# Patient Record
Sex: FEMALE | Race: WHITE | NOT HISPANIC OR LATINO | URBAN - METROPOLITAN AREA
[De-identification: names, ages, dates, MRNs, and addresses within clinical notes are randomized per-mention and may not be internally consistent; named-entity substitution may affect disease eponyms.]

---

## 2024-07-09 ENCOUNTER — APPOINTMENT (OUTPATIENT)
Dept: PRIMARY CARE | Facility: CLINIC | Age: 59
End: 2024-07-09
Payer: COMMERCIAL

## 2024-07-09 DIAGNOSIS — Z00.00 ROUTINE GENERAL MEDICAL EXAMINATION AT A HEALTH CARE FACILITY: Primary | ICD-10-CM

## 2024-07-09 PROCEDURE — UHSMG PR UH SELECT MEET AND GREET: Performed by: INTERNAL MEDICINE

## 2024-08-22 DIAGNOSIS — E78.5 HYPERLIPIDEMIA, UNSPECIFIED HYPERLIPIDEMIA TYPE: ICD-10-CM

## 2024-08-22 DIAGNOSIS — Z00.00 ANNUAL PHYSICAL EXAM: ICD-10-CM

## 2024-08-23 ENCOUNTER — LAB (OUTPATIENT)
Dept: LAB | Facility: LAB | Age: 59
End: 2024-08-23
Payer: COMMERCIAL

## 2024-08-23 DIAGNOSIS — Z00.00 ANNUAL PHYSICAL EXAM: ICD-10-CM

## 2024-08-23 LAB
25(OH)D3 SERPL-MCNC: 70 NG/ML (ref 30–100)
ALBUMIN SERPL BCP-MCNC: 4.6 G/DL (ref 3.4–5)
ALP SERPL-CCNC: 63 U/L (ref 33–110)
ALT SERPL W P-5'-P-CCNC: 16 U/L (ref 7–45)
ANION GAP SERPL CALC-SCNC: 11 MMOL/L (ref 10–20)
APPEARANCE UR: CLEAR
AST SERPL W P-5'-P-CCNC: 19 U/L (ref 9–39)
BACTERIA #/AREA URNS AUTO: ABNORMAL /HPF
BASOPHILS # BLD AUTO: 0.04 X10*3/UL (ref 0–0.1)
BASOPHILS NFR BLD AUTO: 1 %
BILIRUB SERPL-MCNC: 1.2 MG/DL (ref 0–1.2)
BILIRUB UR STRIP.AUTO-MCNC: NEGATIVE MG/DL
BUN SERPL-MCNC: 19 MG/DL (ref 6–23)
CALCIUM SERPL-MCNC: 9.5 MG/DL (ref 8.6–10.3)
CHLORIDE SERPL-SCNC: 100 MMOL/L (ref 98–107)
CHOLEST SERPL-MCNC: 236 MG/DL (ref 0–199)
CHOLESTEROL/HDL RATIO: 2.4
CO2 SERPL-SCNC: 30 MMOL/L (ref 21–32)
COLOR UR: ABNORMAL
CREAT SERPL-MCNC: 0.79 MG/DL (ref 0.5–1.05)
EGFRCR SERPLBLD CKD-EPI 2021: 88 ML/MIN/1.73M*2
EOSINOPHIL # BLD AUTO: 0.09 X10*3/UL (ref 0–0.7)
EOSINOPHIL NFR BLD AUTO: 2.3 %
ERYTHROCYTE [DISTWIDTH] IN BLOOD BY AUTOMATED COUNT: 12 % (ref 11.5–14.5)
EST. AVERAGE GLUCOSE BLD GHB EST-MCNC: 97 MG/DL
GLUCOSE SERPL-MCNC: 87 MG/DL (ref 74–99)
GLUCOSE UR STRIP.AUTO-MCNC: NORMAL MG/DL
HBA1C MFR BLD: 5 %
HCT VFR BLD AUTO: 44.1 % (ref 36–46)
HDLC SERPL-MCNC: 97.6 MG/DL
HGB BLD-MCNC: 14.6 G/DL (ref 12–16)
IMM GRANULOCYTES # BLD AUTO: 0.01 X10*3/UL (ref 0–0.7)
IMM GRANULOCYTES NFR BLD AUTO: 0.3 % (ref 0–0.9)
KETONES UR STRIP.AUTO-MCNC: NEGATIVE MG/DL
LDLC SERPL CALC-MCNC: 126 MG/DL
LEUKOCYTE ESTERASE UR QL STRIP.AUTO: ABNORMAL
LYMPHOCYTES # BLD AUTO: 1.42 X10*3/UL (ref 1.2–4.8)
LYMPHOCYTES NFR BLD AUTO: 36.3 %
MCH RBC QN AUTO: 31.5 PG (ref 26–34)
MCHC RBC AUTO-ENTMCNC: 33.1 G/DL (ref 32–36)
MCV RBC AUTO: 95 FL (ref 80–100)
MONOCYTES # BLD AUTO: 0.35 X10*3/UL (ref 0.1–1)
MONOCYTES NFR BLD AUTO: 9 %
NEUTROPHILS # BLD AUTO: 2 X10*3/UL (ref 1.2–7.7)
NEUTROPHILS NFR BLD AUTO: 51.1 %
NITRITE UR QL STRIP.AUTO: NEGATIVE
NON HDL CHOLESTEROL: 138 MG/DL (ref 0–149)
NRBC BLD-RTO: 0 /100 WBCS (ref 0–0)
PH UR STRIP.AUTO: 7 [PH]
PLATELET # BLD AUTO: 217 X10*3/UL (ref 150–450)
POTASSIUM SERPL-SCNC: 4 MMOL/L (ref 3.5–5.3)
PROT SERPL-MCNC: 6.6 G/DL (ref 6.4–8.2)
PROT UR STRIP.AUTO-MCNC: NEGATIVE MG/DL
RBC # BLD AUTO: 4.64 X10*6/UL (ref 4–5.2)
RBC # UR STRIP.AUTO: NEGATIVE /UL
RBC #/AREA URNS AUTO: ABNORMAL /HPF
SODIUM SERPL-SCNC: 137 MMOL/L (ref 136–145)
SP GR UR STRIP.AUTO: 1.01
SQUAMOUS #/AREA URNS AUTO: ABNORMAL /HPF
TRIGL SERPL-MCNC: 62 MG/DL (ref 0–149)
TSH SERPL-ACNC: 1.52 MIU/L (ref 0.44–3.98)
UROBILINOGEN UR STRIP.AUTO-MCNC: NORMAL MG/DL
VLDL: 12 MG/DL (ref 0–40)
WBC # BLD AUTO: 3.9 X10*3/UL (ref 4.4–11.3)
WBC #/AREA URNS AUTO: ABNORMAL /HPF

## 2024-08-23 PROCEDURE — 80061 LIPID PANEL: CPT

## 2024-08-23 PROCEDURE — 81001 URINALYSIS AUTO W/SCOPE: CPT

## 2024-08-23 PROCEDURE — 85025 COMPLETE CBC W/AUTO DIFF WBC: CPT

## 2024-08-23 PROCEDURE — 84443 ASSAY THYROID STIM HORMONE: CPT

## 2024-08-23 PROCEDURE — 36415 COLL VENOUS BLD VENIPUNCTURE: CPT

## 2024-08-23 PROCEDURE — 80053 COMPREHEN METABOLIC PANEL: CPT

## 2024-08-27 ENCOUNTER — APPOINTMENT (OUTPATIENT)
Dept: PRIMARY CARE | Facility: CLINIC | Age: 59
End: 2024-08-27
Payer: COMMERCIAL

## 2024-08-27 VITALS
WEIGHT: 127.8 LBS | HEIGHT: 69 IN | OXYGEN SATURATION: 98 % | BODY MASS INDEX: 18.93 KG/M2 | HEART RATE: 62 BPM | DIASTOLIC BLOOD PRESSURE: 76 MMHG | SYSTOLIC BLOOD PRESSURE: 118 MMHG

## 2024-08-27 DIAGNOSIS — M85.80 OSTEOPENIA AFTER MENOPAUSE: Primary | ICD-10-CM

## 2024-08-27 DIAGNOSIS — Z85.820 HX OF MELANOMA OF SKIN: ICD-10-CM

## 2024-08-27 DIAGNOSIS — Z78.0 OSTEOPENIA AFTER MENOPAUSE: Primary | ICD-10-CM

## 2024-08-27 ASSESSMENT — ENCOUNTER SYMPTOMS
SINUS PAIN: 0
CONSTITUTIONAL NEGATIVE: 1
VOMITING: 0
HEMATURIA: 0
LIGHT-HEADEDNESS: 0
FACIAL SWELLING: 0
BACK PAIN: 0
WHEEZING: 0
ABDOMINAL PAIN: 0
CONFUSION: 0
BRUISES/BLEEDS EASILY: 0
FEVER: 0
JOINT SWELLING: 0
DIARRHEA: 0
SLEEP DISTURBANCE: 0
ABDOMINAL DISTENTION: 0
NAUSEA: 0
SHORTNESS OF BREATH: 0
NUMBNESS: 0
ADENOPATHY: 0
ACTIVITY CHANGE: 0
CONSTIPATION: 0
BLOOD IN STOOL: 0
DYSURIA: 0
RHINORRHEA: 0
WEAKNESS: 0
NECK STIFFNESS: 0
SORE THROAT: 0
FATIGUE: 0
PALPITATIONS: 0
MYALGIAS: 0
NERVOUS/ANXIOUS: 0
DYSPHORIC MOOD: 0
TROUBLE SWALLOWING: 0
UNEXPECTED WEIGHT CHANGE: 0
CHILLS: 0
CHEST TIGHTNESS: 0
HYPERACTIVE: 0
ARTHRALGIAS: 0
POLYDIPSIA: 0
COUGH: 0
DIZZINESS: 0
HEADACHES: 0
FREQUENCY: 0
SINUS PRESSURE: 0

## 2024-08-27 ASSESSMENT — PROMIS GLOBAL HEALTH SCALE
CARRYOUT_SOCIAL_ACTIVITIES: EXCELLENT
RATE_AVERAGE_PAIN: 0
EMOTIONAL_PROBLEMS: RARELY
RATE_MENTAL_HEALTH: VERY GOOD
CARRYOUT_PHYSICAL_ACTIVITIES: COMPLETELY
RATE_QUALITY_OF_LIFE: EXCELLENT
RATE_SOCIAL_SATISFACTION: EXCELLENT
RATE_GENERAL_HEALTH: EXCELLENT
RATE_PHYSICAL_HEALTH: VERY GOOD

## 2024-08-27 NOTE — PROGRESS NOTES
Cece Aguilar       Patient is here for a complete physical and a general checkup.  She generally is quite healthy.  She has a couple of ongoing issues.  1.  She is battling some right foot tendinitis.  She has had this off and on for as many as 15 years.  Recently however it is flared up after she lunged for a ball playing tennis.  She felt a pulling sensation in the bottom of her right foot instep.  She has been working with a foot and ankle specialist for the last several months doing a lot of stretching and exercising and the symptoms are slowly improving.  She is hoping to get herself into a condition where she can go on a hiking trip this fall in [place].  2.  She had several stereotactic biopsies of her left breast although nothing has ever been found of concern.  Otherwise she is very healthy individual she exercises vigorously on a regular basis doing a host of aerobic and strength training exercises.  She also plays a lot of tennis golf and pickleball.  3.  She has several recent bites on her back and is concerned about those.  She think she may have been bitten by a spider.  4.  Reviewed her labs her lipids are at target she had a cardiac calcium score of 0 a couple of years ago.  5.  She is up-to-date on mammogram  6.  She is up-to-date on colonoscopy  7.  She has never had a bone density study.           Review of Systems   Constitutional: Negative.  Negative for activity change, chills, fatigue, fever and unexpected weight change.   HENT:  Positive for hearing loss. Negative for dental problem, ear pain, facial swelling, mouth sores, rhinorrhea, sinus pressure, sinus pain, sore throat, tinnitus and trouble swallowing.    Eyes:  Positive for visual disturbance (myopia).   Respiratory:  Negative for cough, chest tightness, shortness of breath and wheezing.    Cardiovascular:  Negative for chest pain, palpitations and leg swelling.   Gastrointestinal:  Negative for abdominal distention,  "abdominal pain, blood in stool, constipation, diarrhea, nausea and vomiting.   Endocrine: Negative for cold intolerance, heat intolerance, polydipsia and polyuria.   Genitourinary:  Negative for dysuria, frequency, hematuria and urgency.   Musculoskeletal:  Negative for arthralgias, back pain, joint swelling, myalgias and neck stiffness.   Skin:  Negative for rash.        Several L breast biopsies    Allergic/Immunologic: Negative for food allergies.   Neurological:  Negative for dizziness, weakness, light-headedness, numbness and headaches.   Hematological:  Negative for adenopathy. Does not bruise/bleed easily.   Psychiatric/Behavioral:  Negative for confusion, dysphoric mood and sleep disturbance. The patient is not nervous/anxious and is not hyperactive.           Objective      Visit Vitals  /76   Pulse 62   Ht 1.74 m (5' 8.5\")   Wt 58 kg (127 lb 12.8 oz)   SpO2 98%   BMI 19.15 kg/m²   Smoking Status Never   BSA 1.67 m²        Physical Exam  Constitutional:       Appearance: Normal appearance. She is normal weight.   HENT:      Head: Normocephalic and atraumatic.      Right Ear: Tympanic membrane, ear canal and external ear normal.      Left Ear: Tympanic membrane, ear canal and external ear normal.      Nose: Nose normal.      Mouth/Throat:      Mouth: Mucous membranes are moist. No oral lesions.      Pharynx: Oropharynx is clear. Uvula midline. No oropharyngeal exudate or posterior oropharyngeal erythema.   Neck:      Thyroid: No thyroid mass or thyromegaly.      Vascular: No carotid bruit or JVD.   Cardiovascular:      Rate and Rhythm: Normal rate and regular rhythm.      Pulses: Normal pulses.           Carotid pulses are 2+ on the right side and 2+ on the left side.       Radial pulses are 2+ on the right side and 2+ on the left side.        Femoral pulses are 2+ on the right side and 2+ on the left side.       Popliteal pulses are 2+ on the right side and 2+ on the left side.        Dorsalis pedis " pulses are 2+ on the right side and 2+ on the left side.        Posterior tibial pulses are 2+ on the right side and 2+ on the left side.      Heart sounds: Normal heart sounds, S1 normal and S2 normal. No murmur heard.  Pulmonary:      Effort: Pulmonary effort is normal.      Breath sounds: Normal breath sounds.   Chest:   Breasts:     Right: Normal. No mass or nipple discharge.      Left: Normal. No mass or nipple discharge.   Abdominal:      General: Abdomen is flat. Bowel sounds are normal.      Palpations: Abdomen is soft.      Tenderness: There is no abdominal tenderness.      Hernia: No hernia is present.   Musculoskeletal:         General: No swelling or tenderness. Normal range of motion.      Cervical back: Normal range of motion and neck supple. No rigidity.      Right lower leg: No edema.      Left lower leg: No edema.   Lymphadenopathy:      Cervical: No cervical adenopathy.   Skin:     General: Skin is warm and dry.      Findings: No lesion (Several insect bites on her back.  With a small erythematous reaction.) or rash.   Neurological:      General: No focal deficit present.      Mental Status: She is alert and oriented to person, place, and time. Mental status is at baseline.   Psychiatric:         Mood and Affect: Mood normal.         Thought Content: Thought content normal.              Assess/Plan SmartLinks:   Problem List Items Addressed This Visit             ICD-10-CM    Osteopenia after menopause - Primary M85.80, Z78.0     DEXA scan is ordered for initial bone density assessment.  Will treat her accordingly.         Relevant Orders    XR DEXA bone density    Hx of melanoma of skin Z85.820     Patient continues to see her dermatologist on a regular basis.        Patient's lipids are at target  Patient is up-to-date on her mammogram  Patient is up-to-date on colonoscopy  Patient is up-to-date on all vaccines  Patient will meet with our fitness assessment team

## 2024-09-17 ENCOUNTER — HOSPITAL ENCOUNTER (OUTPATIENT)
Dept: RADIOLOGY | Facility: HOSPITAL | Age: 59
Discharge: HOME | End: 2024-09-17
Payer: COMMERCIAL

## 2024-09-17 DIAGNOSIS — Z78.0 OSTEOPENIA AFTER MENOPAUSE: ICD-10-CM

## 2024-09-17 DIAGNOSIS — M85.80 OSTEOPENIA AFTER MENOPAUSE: ICD-10-CM

## 2024-09-17 PROCEDURE — 77080 DXA BONE DENSITY AXIAL: CPT

## 2024-09-17 PROCEDURE — 77080 DXA BONE DENSITY AXIAL: CPT | Performed by: RADIOLOGY

## 2024-09-27 ENCOUNTER — TELEPHONE (OUTPATIENT)
Dept: OBSTETRICS AND GYNECOLOGY | Facility: CLINIC | Age: 59
End: 2024-09-27
Payer: COMMERCIAL

## 2024-10-03 ENCOUNTER — APPOINTMENT (OUTPATIENT)
Dept: OBSTETRICS AND GYNECOLOGY | Facility: CLINIC | Age: 59
End: 2024-10-03
Payer: COMMERCIAL

## 2024-10-16 DIAGNOSIS — Z12.31 ENCOUNTER FOR SCREENING MAMMOGRAM FOR BREAST CANCER: ICD-10-CM

## 2024-11-05 ENCOUNTER — HOSPITAL ENCOUNTER (OUTPATIENT)
Dept: RADIOLOGY | Facility: CLINIC | Age: 59
Discharge: HOME | End: 2024-11-05
Payer: COMMERCIAL

## 2024-11-05 DIAGNOSIS — Z12.31 ENCOUNTER FOR SCREENING MAMMOGRAM FOR BREAST CANCER: ICD-10-CM

## 2024-11-05 PROCEDURE — 77067 SCR MAMMO BI INCL CAD: CPT

## 2024-11-12 ENCOUNTER — HOSPITAL ENCOUNTER (OUTPATIENT)
Dept: RADIOLOGY | Facility: EXTERNAL LOCATION | Age: 59
Discharge: HOME | End: 2024-11-12

## 2024-11-26 ENCOUNTER — APPOINTMENT (OUTPATIENT)
Dept: PRIMARY CARE | Facility: CLINIC | Age: 59
End: 2024-11-26

## 2025-01-10 ENCOUNTER — OFFICE VISIT (OUTPATIENT)
Dept: PRIMARY CARE | Facility: CLINIC | Age: 60
End: 2025-01-10
Payer: COMMERCIAL

## 2025-01-10 VITALS — OXYGEN SATURATION: 98 % | SYSTOLIC BLOOD PRESSURE: 110 MMHG | HEART RATE: 72 BPM | DIASTOLIC BLOOD PRESSURE: 72 MMHG

## 2025-01-10 DIAGNOSIS — M19.071 ARTHRITIS OF RIGHT FOOT: Primary | ICD-10-CM

## 2025-01-10 PROCEDURE — 99213 OFFICE O/P EST LOW 20 MIN: CPT | Performed by: INTERNAL MEDICINE

## 2025-01-10 RX ORDER — MELOXICAM 15 MG/1
15 TABLET ORAL DAILY
Qty: 30 TABLET | Refills: 11 | Status: SHIPPED | OUTPATIENT
Start: 2025-01-10 | End: 2026-01-10

## 2025-01-10 RX ORDER — OMEPRAZOLE 20 MG/1
20 CAPSULE, DELAYED RELEASE ORAL DAILY
Qty: 30 CAPSULE | Refills: 5 | Status: SHIPPED | OUTPATIENT
Start: 2025-01-10 | End: 2025-07-09

## 2025-01-10 NOTE — PROGRESS NOTES
R foot since July 17ys fx bone   Patient is here for pain in her right foot that has been present now since July.  Patient fractured her right foot about 17 years ago after doing some long runs.  She was treated nonoperatively at that time.  She has always had history of pes planus and narrow feet.  She has noticed increasing pain in the right foot mostly in the medial portion above the arch that is worse when she does any kind of strenuous physical activity.  She has been working with a chiropractor and a running foot specialist who have prescribed some new shoes and an orthotic.  She has not taken any anti-inflammatories.    She has been doing a lot of exercising and strengthening and stretching exercises.  She is been doing some taping as well.    Her exam reveals significant pes planus bilaterally right greater than left.  She has some mild tenderness on the medial malleolus area.  There is no redness warmth or swelling.    Persistent foot pain in this patient with a history of previous injury significant pes planus and narrow feet.  I suspect she has some chronic tendinitis or arthritis.  Will start her on meloxicam 15 mg daily and refer her over to Dr. Jay for further evaluation she will benefit from imaging.  She is also given a prescription for omeprazole 20 mg daily she tends to get heartburn when she takes nonsteroidals.

## 2025-01-14 ENCOUNTER — APPOINTMENT (OUTPATIENT)
Dept: PRIMARY CARE | Facility: CLINIC | Age: 60
End: 2025-01-14
Payer: COMMERCIAL

## 2025-02-03 ENCOUNTER — OFFICE VISIT (OUTPATIENT)
Dept: ORTHOPEDIC SURGERY | Facility: CLINIC | Age: 60
End: 2025-02-03
Payer: COMMERCIAL

## 2025-02-03 ENCOUNTER — HOSPITAL ENCOUNTER (OUTPATIENT)
Dept: RADIOLOGY | Facility: CLINIC | Age: 60
Discharge: HOME | End: 2025-02-03
Payer: COMMERCIAL

## 2025-02-03 DIAGNOSIS — M76.821 POSTERIOR TIBIAL TENDON DYSFUNCTION (PTTD) OF RIGHT LOWER EXTREMITY: ICD-10-CM

## 2025-02-03 DIAGNOSIS — M21.42 BILATERAL PES PLANUS: Primary | ICD-10-CM

## 2025-02-03 DIAGNOSIS — M21.6X1 ACQUIRED PRONATION DEFORMITY OF RIGHT FOOT: ICD-10-CM

## 2025-02-03 DIAGNOSIS — M25.571 RIGHT ANKLE PAIN, UNSPECIFIED CHRONICITY: ICD-10-CM

## 2025-02-03 DIAGNOSIS — M21.41 BILATERAL PES PLANUS: Primary | ICD-10-CM

## 2025-02-03 DIAGNOSIS — M19.071 ARTHRITIS OF RIGHT FOOT: ICD-10-CM

## 2025-02-03 DIAGNOSIS — M21.6X2 ACQUIRED PRONATION DEFORMITY OF LEFT FOOT: ICD-10-CM

## 2025-02-03 PROCEDURE — 73630 X-RAY EXAM OF FOOT: CPT | Mod: RIGHT SIDE | Performed by: RADIOLOGY

## 2025-02-03 PROCEDURE — 73630 X-RAY EXAM OF FOOT: CPT | Mod: RT

## 2025-02-03 PROCEDURE — 99204 OFFICE O/P NEW MOD 45 MIN: CPT | Performed by: STUDENT IN AN ORGANIZED HEALTH CARE EDUCATION/TRAINING PROGRAM

## 2025-02-03 PROCEDURE — 73610 X-RAY EXAM OF ANKLE: CPT | Mod: RT

## 2025-02-03 PROCEDURE — 73610 X-RAY EXAM OF ANKLE: CPT | Mod: RIGHT SIDE | Performed by: RADIOLOGY

## 2025-02-03 PROCEDURE — 99214 OFFICE O/P EST MOD 30 MIN: CPT | Performed by: STUDENT IN AN ORGANIZED HEALTH CARE EDUCATION/TRAINING PROGRAM

## 2025-02-03 ASSESSMENT — PAIN - FUNCTIONAL ASSESSMENT: PAIN_FUNCTIONAL_ASSESSMENT: 0-10

## 2025-02-03 ASSESSMENT — PAIN DESCRIPTION - DESCRIPTORS: DESCRIPTORS: ACHING;DULL;TENDER;THROBBING

## 2025-02-03 ASSESSMENT — PAIN SCALES - GENERAL: PAINLEVEL_OUTOF10: 3

## 2025-02-03 NOTE — PATIENT INSTRUCTIONS
[facility]: [phone]    [facility]: [phone]    [facility]: [phone]    [facility]:  [phone]

## 2025-02-03 NOTE — PROGRESS NOTES
ORTHOPAEDIC SURGERY OUTPATIENT PROGRESS NOTE    Chief Complaint:  Right foot and ankle pain    History Of Present Illness  Cece Aguilar is a 59 y.o. female who presents for evaluation of right foot and ankle pain as a referral from Dr. Guillory.  Patient has been quite active, previously running half marathons.  She continues to participate in racquet sports including tennis as well as paddle and CrossFit.  Unfortunately, last spring she had to stop running due to pain.  She attempted to run in July 2024 but was unable to due to pain.  She has been following with a chiropractic sports medicine provider, Rey Bloom who assisted her in controlling her swelling and pain.  She has been trying to build back more for activities but continues to notice worsening pain with running as well as lunges.  She continues with 3 out of 10 pain, this is worse with higher impact activity.  She obtained new shoes in December and has previously been prescribed custom fabricated orthotics approximately 5 years ago and has been taking meloxicam of late with some improvement in her symptoms overall.  Patient has not had any formal HEP, PT or CSI.  She has been using orthotics, no braces or inserts in her shoes.     Past Medical History  Past Medical History:   Diagnosis Date    Melanoma (Multi)        Surgical History  Recent Surgeries in Orthopaedic Surgery            No cases to display             Social History  Social History     Socioeconomic History    Marital status:    Tobacco Use    Smoking status: Never   Substance and Sexual Activity    Alcohol use: Yes     Alcohol/week: 14.0 standard drinks of alcohol     Types: 14 Glasses of wine per week    Drug use: Never     Social Drivers of Health     Financial Resource Strain: Low Risk  (4/26/2022)    [facility] ([facility])     Difficulty of Paying Living Expenses: Not hard at all   Food Insecurity: No Food  Insecurity (4/26/2022)    Received from [facility]    Hunger Vital Sign     Worried About Running Out of Food in the Last Year: Never true     Ran Out of Food in the Last Year: Never true   Transportation Needs: No Transportation Needs (4/26/2022)    Received from [facility]    PRAPARE - Transportation     Lack of Transportation (Medical): No     Lack of Transportation (Non-Medical): No   Physical Activity: Sufficiently Active (4/26/2022)    Received from [facility]    Exercise Vital Sign     Days of Exercise per Week: 6 days     Minutes of Exercise per Session: 30 min   Stress: No Stress Concern Present (4/26/2022)    Received from [facility]    [employer] - Occupational Stress Questionnaire     Feeling of Stress : Not at all   Social Connections: Unknown (4/26/2022)    Received from [facility]    Social Connection and Isolation Panel [NHANES]     Frequency of Communication with Friends and Family: More than three times a week     Frequency of Social Gatherings with Friends and Family: More than three times a week     Attends [employer] Services: Patient declined     Active Member of Clubs or Organizations: Yes     Attends Club or Organization Meetings: More than 4 times per year     Marital Status:    Housing Stability: Low Risk  (4/26/2022)    Received from [facility]    Housing Stability Vital Sign     Unable to Pay for Housing in the Last Year: No     Number of Places Lived in the Last Year: 1     Unstable Housing in the Last Year: No       Family History  Family History   Problem Relation Name Age of Onset    Melanoma Mother      Hyperlipidemia Mother      Melanoma Father      Hyperlipidemia Father          Allergies  Allergies   Allergen Reactions    Insect Venom Swelling       Review of Systems  REVIEW OF SYSTEMS  Constitutional:  no unplanned weight loss  Psychiatric: no suicidal ideation  ENT: no vision changes, no sinus problems  Pulmonary: no shortness of breath  Lymphatic: no enlarged lymph nodes  Cardiovascular: no chest pain or shortness of breath  Gastrointestinal: no stomach problems  Genitourinary: no dysuria   Skin: no rashes  Endocrine: no thyroid problems  Neurological: no headache, no numbness  Hematological: no easy bruising  Musculoskeletal: Right foot and ankle pain     Physical Exam  PHYSICAL EXAMINATION  Constitutional Exam: well developed and well nourished  Psychiatric Exam: alert and oriented, appropriate mood and behavior  Eye Exam: EOMI  Pulmonary Exam: breathing non-labored, no apparent distress  Lymphatic exam: no appreciable lymphadenopathy in the lower extremities  Cardiovascular exam: RRR to peripheral palpation, DP pulses 2+, PT 2+, toes are pink with good capillary refill, no pitting edema  Skin exam: no open lesions, rashes, abrasions or ulcerations  Neurological exam: sensation to light touch intact in both lower extremities in peripheral and dermatomal distributions (except for any abnormalities noted in musculoskeletal exam)    Musculoskeletal exam: Right lower extremity examination.  Patient pain localized to the anterior medial aspect of the ankle and just distal to the medial malleolus.  She is focally tender to palpation overlying the deltoid as well as PTT.  Minimal tenderness to palpation at the sinus Tarsi although does note pain in that region at times.  No obvious ankle effusion.  Patient has pain-free and supple ankle range of motion without crepitus.  subtalar and midtarsal joint range of motion supple and pain-free.  Patient has greater than physiologic hindfoot valgus with pes planus arch posture and too many toes sign evident.  Patient has sensation grossly intact to light touch in a saphenous, sural, superficial peroneal, deep peroneal and tibial nerve distribution.  She is intact  plantarflexion, dorsiflexion and EHL, she has 4- out of 5 strength in inversion with pain and 5 out of 5 strength in eversion without pain.  She has 2+ DP/PT pulses palpated.    Left lower extremity examination.  Patient with greater than physiologic hindfoot valgus with pes planus arch posture and forefoot abduction noted.  Foot appears warm well-perfused.  Patient is unable to perform a single or double leg assisted heel rise.  The calcaneus does not correct beyond neutral.    Last Recorded Vitals  There were no vitals taken for this visit.    Laboratory Results  No results found for this or any previous visit (from the past 24 hours).     Radiology Results  X-ray imaging 3 view weightbearing right foot and ankle reviewed and independently evaluated by me on 02/03/2025 demonstrates no acute fracture or dislocation.  Ankle mortise appears well aligned, there is no obvious talar tilt or medial clear space widening.  AP foot projection suggests increased kites angle and possible os navicularis, lateral foot suggest pes planus posture with perhaps mild TN OA with dorsal osteophytosis.    Assessment/Plan:  59-year-old female who my impression has right foot pain likely secondary to progressive collapsing foot deformity with features of pes planus posture, acquired pronation deformity, PTTD as well as left pes planus posture and acquired pronation deformity.  I have reviewed the diagnosis and treatment options extensively with the patient.  I would recommend the patient continue weightbearing to her tolerance in her bilateral lower extremities.  We discussed the role for oral and topical anti-inflammatories for symptomatic relief.  I will provide her with a new prescription for a custom fabricated orthotic to update her orthotics.  Additionally, I will refer her to physical therapy for PTT strengthening.  She will continue to modify her activities with a preference for lower impact activity so as to avoid undue pain.  I  reviewed that from a surgical treatment standpoint if she were to fail an exhaustive and appropriate course of nonoperative treatment she may ultimately consider a right flatfoot reconstruction through a joint sparing or arthrodesis approach pending clinical and radiographic review at that time.  I would anticipate obtaining an MRI prior to consideration for surgery.  I will plan to see the patient back in 2 months to follow her clinical course.  Upon return, patient would not require further imaging.    Please provide Dr. Guillory with a copy of this encounter.    Jordan Calderon MD, MANUEL  Department of Orthopaedic Surgery  [facility]    The diagnosis and treatment plan were reviewed with the patient. All questions were answered. The patient verbalized understanding of the treatment plan. There were no barriers to understanding identified.    Note dictated with [employer] software.  Completed without full type editing and sent to avoid delay.

## 2025-02-03 NOTE — LETTER
February 3, 2025     Woo Guillory MD  [address] [place] [address] [place] [address]    Patient: Cece Aguilar   YOB: 1965   Date of Visit: 2/3/2025       Dear Dr. Woo Guillory MD:    Thank you for referring Cece Aguilar to me for evaluation. Below are my notes for this consultation.  If you have questions, please do not hesitate to call me. I look forward to following your patient along with you.       Sincerely,     Jordan Calderon MD      CC: No Recipients  ______________________________________________________________________________________    ORTHOPAEDIC SURGERY OUTPATIENT PROGRESS NOTE    Chief Complaint:  Right foot and ankle pain    History Of Present Illness  Cece Aguilar is a 59 y.o. female who presents for evaluation of right foot and ankle pain as a referral from Dr. Guillory.  Patient has been quite active, previously running half marathons.  She continues to participate in racquet sports including tennis as well as paddle and CrossFit.  Unfortunately, last spring she had to stop running due to pain.  She attempted to run in July 2024 but was unable to due to pain.  She has been following with a chiropractic sports medicine provider, Rey Bloom who assisted her in controlling her swelling and pain.  She has been trying to build back more for activities but continues to notice worsening pain with running as well as lunges.  She continues with 3 out of 10 pain, this is worse with higher impact activity.  She obtained new shoes in December and has previously been prescribed custom fabricated orthotics approximately 5 years ago and has been taking meloxicam of late with some improvement in her symptoms overall.  Patient has not had any formal HEP, PT or CSI.  She has been using orthotics, no braces or inserts in her shoes.     Past Medical History  Past Medical History:   Diagnosis Date   • Melanoma (Multi)        Surgical History  Recent Surgeries in Orthopaedic Surgery            No cases to display              Social History  Social History     Socioeconomic History   • Marital status:    Tobacco Use   • Smoking status: Never   Substance and Sexual Activity   • Alcohol use: Yes     Alcohol/week: 14.0 standard drinks of alcohol     Types: 14 Glasses of wine per week   • Drug use: Never     Social Drivers of Health     Financial Resource Strain: Low Risk  (4/26/2022)    Received from [facility]    Overall Financial Resource Strain (CARDIA)    • Difficulty of Paying Living Expenses: Not hard at all   Food Insecurity: No Food Insecurity (4/26/2022)    Received from [facility]    Hunger Vital Sign    • Worried About Running Out of Food in the Last Year: Never true    • Ran Out of Food in the Last Year: Never true   Transportation Needs: No Transportation Needs (4/26/2022)    Received from [facility]    PRAPARE - Transportation    • Lack of Transportation (Medical): No    • Lack of Transportation (Non-Medical): No   Physical Activity: Sufficiently Active (4/26/2022)    Received from [facility]    Exercise Vital Sign    • Days of Exercise per Week: 6 days    • Minutes of Exercise per Session: 30 min   Stress: No Stress Concern Present (4/26/2022)    Received from [facility]    [employer] - Occupational Stress Questionnaire    • Feeling of Stress : Not at all   Social Connections: Unknown (4/26/2022)    Received from [facility]    Social Connection and Isolation Panel [NHANES]    • Frequency of Communication with Friends and Family: More than three times a week    • Frequency of Social Gatherings with Friends and Family: More than three times a week    • Attends Confucianism Services: Patient declined    • Active Member of Clubs or Organizations: Yes    • Attends Club or Organization Meetings: More than 4 times per year    • Marital Status:     Housing Stability: Low Risk  (4/26/2022)    [facility]    Housing Stability Vital Sign    • Unable to Pay for Housing in the Last Year: No    • Number of Places Lived in the Last Year: 1    • Unstable Housing in the Last Year: No       Family History  Family History   Problem Relation Name Age of Onset   • Melanoma Mother     • Hyperlipidemia Mother     • Melanoma Father     • Hyperlipidemia Father          Allergies  Allergies   Allergen Reactions   • Insect Venom Swelling       Review of Systems  REVIEW OF SYSTEMS  Constitutional: no unplanned weight loss  Psychiatric: no suicidal ideation  ENT: no vision changes, no sinus problems  Pulmonary: no shortness of breath  Lymphatic: no enlarged lymph nodes  Cardiovascular: no chest pain or shortness of breath  Gastrointestinal: no stomach problems  Genitourinary: no dysuria   Skin: no rashes  Endocrine: no thyroid problems  Neurological: no headache, no numbness  Hematological: no easy bruising  Musculoskeletal: Right foot and ankle pain     Physical Exam  PHYSICAL EXAMINATION  Constitutional Exam: well developed and well nourished  Psychiatric Exam: alert and oriented, appropriate mood and behavior  Eye Exam: EOMI  Pulmonary Exam: breathing non-labored, no apparent distress  Lymphatic exam: no appreciable lymphadenopathy in the lower extremities  Cardiovascular exam: RRR to peripheral palpation, DP pulses 2+, PT 2+, toes are pink with good capillary refill, no pitting edema  Skin exam: no open lesions, rashes, abrasions or ulcerations  Neurological exam: sensation to light touch intact in both lower extremities in peripheral and dermatomal distributions (except for any abnormalities noted in musculoskeletal exam)    Musculoskeletal exam: Right lower extremity examination.  Patient pain localized to the anterior medial aspect of the ankle and just distal to the medial malleolus.  She is focally tender to palpation  overlying the deltoid as well as PTT.  Minimal tenderness to palpation at the sinus Tarsi although does note pain in that region at times.  No obvious ankle effusion.  Patient has pain-free and supple ankle range of motion without crepitus.  subtalar and midtarsal joint range of motion supple and pain-free.  Patient has greater than physiologic hindfoot valgus with pes planus arch posture and too many toes sign evident.  Patient has sensation grossly intact to light touch in a saphenous, sural, superficial peroneal, deep peroneal and tibial nerve distribution.  She is intact plantarflexion, dorsiflexion and EHL, she has 4- out of 5 strength in inversion with pain and 5 out of 5 strength in eversion without pain.  She has 2+ DP/PT pulses palpated.    Left lower extremity examination.  Patient with greater than physiologic hindfoot valgus with pes planus arch posture and forefoot abduction noted.  Foot appears warm well-perfused.  Patient is unable to perform a single or double leg assisted heel rise.  The calcaneus does not correct beyond neutral.    Last Recorded Vitals  There were no vitals taken for this visit.    Laboratory Results  No results found for this or any previous visit (from the past 24 hours).     Radiology Results  X-ray imaging 3 view weightbearing right foot and ankle reviewed and independently evaluated by me on 02/03/2025 demonstrates no acute fracture or dislocation.  Ankle mortise appears well aligned, there is no obvious talar tilt or medial clear space widening.  AP foot projection suggests increased kites angle and possible os navicularis, lateral foot suggest pes planus posture with perhaps mild TN OA with dorsal osteophytosis.    Assessment/Plan:  59-year-old female who my impression has right foot pain likely secondary to progressive collapsing foot deformity with features of pes planus posture, acquired pronation deformity, PTTD as well as left pes planus posture and acquired pronation  deformity.  I have reviewed the diagnosis and treatment options extensively with the patient.  I would recommend the patient continue weightbearing to her tolerance in her bilateral lower extremities.  We discussed the role for oral and topical anti-inflammatories for symptomatic relief.  I will provide her with a new prescription for a custom fabricated orthotic to update her orthotics.  Additionally, I will refer her to physical therapy for PTT strengthening.  She will continue to modify her activities with a preference for lower impact activity so as to avoid undue pain.  I reviewed that from a surgical treatment standpoint if she were to fail an exhaustive and appropriate course of nonoperative treatment she may ultimately consider a right flatfoot reconstruction through a joint sparing or arthrodesis approach pending clinical and radiographic review at that time.  I would anticipate obtaining an MRI prior to consideration for surgery.  I will plan to see the patient back in 2 months to follow her clinical course.  Upon return, patient would not require further imaging.    Please provide Dr. Guillory with a copy of this encounter.    Jordan Calderon MD, MANUEL  Department of Orthopaedic Surgery  [facility]    The diagnosis and treatment plan were reviewed with the patient. All questions were answered. The patient verbalized understanding of the treatment plan. There were no barriers to understanding identified.    Note dictated with BeQuan software.  Completed without full type editing and sent to avoid delay.

## 2025-02-24 ENCOUNTER — EVALUATION (OUTPATIENT)
Dept: PHYSICAL THERAPY | Facility: CLINIC | Age: 60
End: 2025-02-24
Payer: COMMERCIAL

## 2025-02-24 DIAGNOSIS — M25.571 RIGHT ANKLE PAIN, UNSPECIFIED CHRONICITY: ICD-10-CM

## 2025-02-24 DIAGNOSIS — M19.071 ARTHRITIS OF RIGHT FOOT: ICD-10-CM

## 2025-02-24 DIAGNOSIS — M21.42 BILATERAL PES PLANUS: ICD-10-CM

## 2025-02-24 DIAGNOSIS — M76.821 POSTERIOR TIBIAL TENDON DYSFUNCTION (PTTD) OF RIGHT LOWER EXTREMITY: ICD-10-CM

## 2025-02-24 DIAGNOSIS — M21.41 BILATERAL PES PLANUS: ICD-10-CM

## 2025-02-24 PROCEDURE — 97161 PT EVAL LOW COMPLEX 20 MIN: CPT | Mod: GP | Performed by: PHYSICAL THERAPIST

## 2025-02-24 PROCEDURE — 97110 THERAPEUTIC EXERCISES: CPT | Mod: GP | Performed by: PHYSICAL THERAPIST

## 2025-02-24 ASSESSMENT — PAIN - FUNCTIONAL ASSESSMENT: PAIN_FUNCTIONAL_ASSESSMENT: 0-10

## 2025-02-24 ASSESSMENT — PAIN SCALES - GENERAL: PAINLEVEL_OUTOF10: 1

## 2025-02-24 NOTE — PROGRESS NOTES
Physical Therapy  Physical Therapy Orthopedic Evaluation    Patient Name: Cece Aguilar  MRN: [id number]  Today's Date: 2/24/2025  Time Calculation  Start Time: 1350  Stop Time: 1438  Time Calculation (min): 48 min  Reason for visit: XI pes planus, posterior tibial tendon dysfunction, right foot OA  Referring MD: Jordan Calderon  Insurance:  MMO: NO AUTH/ $8300 DED (NM)/ 50% COVERAGE/ 20 VISIT MX/ $9200 OOP (NM)/ PER SARAH WITH MMO   DX: XI pes planus., right ankle pain, OA right foot, PPT dysfunction right   Frequency: 1 /week or every other week   Duration: 6-8 sessions    Current Problem  1. Posterior tibial tendon dysfunction (PTTD) of right lower extremity  Referral to Physical Therapy    Follow Up In Physical Therapy      2. Right ankle pain, unspecified chronicity  Referral to Physical Therapy    Follow Up In Physical Therapy      3. Bilateral pes planus  Referral to Physical Therapy    Follow Up In Physical Therapy      4. Arthritis of right foot  Referral to Physical Therapy    Follow Up In Physical Therapy          General:  General  Reason for Referral: XI pes planus, posterior tibial tendon dysfunction, right foot OA  Referred By: Jordan Calderon  Precautions:  Precautions  STEADI Fall Risk Score (The score of 4 or more indicates an increased risk of falling): 0  Pain:  Pain Assessment: 0-10  0-10 (Numeric) Pain Score: 1    Subjective:     Subjective   Chief Complaint: Right foot pain.  Had stress fracture injury 15 + years ago.  I was seeing a  and got orthotic/had them adjusted.  In April-ish I started to feel funny when I was t=running, like a pinch or pain.  I stopped running.  I was playing tennis and at one match I was sprinting for a ball and then felt like I had a hard time bearing weight on it and swelling later in the day.    I have been seeing a sports chiropractor which helped some.  Any time I ad more ie adding lunging etc and could not due to pain.    I like to play  paddle tennis and tennis but both seem to aggravate it.    It does not bother me at night/I am able to sleep.    I had new orthotics made and I get them on Wednesday   Has been wearing an akle sleeve when playing tennis or paddle      Current Condition: improving    PAIN  Intensity (0-10): 1/10 currently up to 7-8/10  Location: inside right ankle   Description: dull aching     Aggravating Factors:  being active, any sprinting or quick movements, jumping,   Relieving Factors:  rest , gentle exercise    Relevant Information (PMH & Previous Tests/Imaging):   Xray right ankle and foot :  FINDINGS:  No fracture or dislocation is evident.  There is posterior and  plantar calcaneal enthesophyte formation. There is a degree of pes  planus. There is an osseous excrescence off of the medial side of the  distal metaphysis of the 3rd metatarsal. Minimal degenerative changes  seen of the interphalangeal joint of the 1st digit. Mild degenerative  changes seen of the ankle joint. No soft tissue gas or radiopaque  foreign body is evident.      IMPRESSION:  1. Osseous irregularity of the medial side of the distal 3rd  metatarsal. Some differential considerations include sequelae of  prior fracture and a small sessile osteochondroma. MRI may be helpful  for further assessment.  2. Pes planus.  3. Degenerative change as above.      Prior Level of Function (PLOF)  Exercise/Physical Activity: cross fit 3 times a week, tennis/paddle 2-3 times a week,   Work/School:Retired     Living situation/Environment: lives with spouse, no problems reported     Treatment Goals: be able to play tennis and get back to running if I can     Red Flags: Do you have any of the following? No   Fever/chills, unexplained weight changes, dizziness/fainting, unexplained change in bowel or bladder functions, unexplained malaise or muscle weakness, night pain/sweats, numbness or tingling  Medical history reviewed:  yes (scanned in chart)    Objective:  Objective        Observation/Posture: pes planus XI , TTP right post tib tendon at malleolus   Gait: non antalgic    Balance  SL Balance: L= 30 ; R= 5-6 sec     DL Squat:  favors left,   SL Squat: partial range,  valgus collapse,     Ankle ROM  DF L= 20 [] R= 15  PF L= 55 [] R= 55  Inv L= 30 [] R= 30  Ev L= 13 [] R= 15    Ankle Strength MMT  DF L= 5/5 [] R= 5/5   PF L= 4+/5 [] R= 4+/5   Inv L= 4-/5 [] R= 4-/5   Ev L= 5/5 [] R= 5/5     Hip Strength MMT  Flex: L= 4/5 [] R= 4/5   Abd L= 4/5 [] R= 4/5  Add L= 5/5 [] R= 5/5  Ext L= 4-/5 [] R= 4-/5    Flexibility:  Hamstrings: WNL  Gastroc:MIN  Quads:MOD  Hip Flexor:MOD  Piriformis:WFL        Outcome Measures:        EDUCATION: home exercise program, plan of care, activity modifications, pain management, and injury pathology       Goals:  Active       PT Problem       PT Goal 1       Start:  02/24/25    Expected End:  04/07/25       Patient will demonstrate good understanding and compliance with HEP   Right DF AROM = left          PT Goal 2       Start:  02/24/25    Expected End:  05/05/25       Single leg dip w/o valgus collapse to improve control and dec stress on knee hip and ankle  Ankle inversion and PF strength 5-/5 or better to improve ankle stability   SLS right 20-30 seconds to demonstrate improved stability and control   Hip strength 4+/5 or better to improve stability and functional control to allow participation in rec activity w/o increased pain.               Treatments:   Access Code: [email or web address]  URL: [email or web address]  Date: 02/24/2025  Prepared by: Mauri Oseguera    Exercises  - Long Sitting Calf Stretch with Strap  - 2-3 x daily - 1 sets - 3-4 reps - 30 hold  - Quadriceps Stretch with Chair  - 2-3 x daily - 3-4 reps - 30 hold  - Ankle Plantar Flexion with Resistance  - 1-2 x daily - 1 sets - 20-30 reps  - Long Sitting Ankle Inversion with Resistance  - 1-2 x daily - 1 sets - 20-30 reps  - Supine Bridge with Resistance Band  - 1-2 x daily -  2-3 sets - 10 reps  - Squat with Chair Touch and Resistance Loop  - 1 x daily - 7 x weekly - 3 sets - 10 reps    Assessment: Patient presents with signs and symptoms consistent with impaired foot/ankle posture, dec ankle and hip strength, impaired functional control, and impaired balance , resulting in limited participation in pain-free ADLs and inability to perform at their prior level of function including tennis and running . Pt would benefit from physical therapy to address the impairments found & listed previously in the objective section in order to return to safe and pain-free ADLs and prior level of function.     Pain, Impaired balance, Decreased flexibility, Decreased strength, Limitations to normal ADL's, and Decreased knowledge of HEP     Clinical presentation:  Stable   Level of Complexity low     Plan:   Rehab Potential: Good  Plan of Care Agreement: Patient  Planned Interventions include: therapeutic exercise, self-care home management, manual therapy, therapeutic activities, gait training, neuromuscular coordination, aquatic therapy, modalities PRN  Frequency: 1 /week or every other week   Duration: 6-8 sessions    Mauri Oseguera, PT

## 2025-03-04 ENCOUNTER — DOCUMENTATION (OUTPATIENT)
Dept: PHYSICAL THERAPY | Facility: CLINIC | Age: 60
End: 2025-03-04
Payer: COMMERCIAL

## 2025-03-04 NOTE — PROGRESS NOTES
Physical Therapy  Patient No-Show Documentation       Cece Aguilar no showed for their visit on 3/4/2025. This is the 1st occurrence.       Mauri Oseguera, PT

## 2025-03-12 ENCOUNTER — TREATMENT (OUTPATIENT)
Dept: PHYSICAL THERAPY | Facility: CLINIC | Age: 60
End: 2025-03-12
Payer: COMMERCIAL

## 2025-03-12 DIAGNOSIS — M76.821 POSTERIOR TIBIAL TENDON DYSFUNCTION (PTTD) OF RIGHT LOWER EXTREMITY: ICD-10-CM

## 2025-03-12 DIAGNOSIS — M25.571 RIGHT ANKLE PAIN, UNSPECIFIED CHRONICITY: ICD-10-CM

## 2025-03-12 DIAGNOSIS — M19.071 ARTHRITIS OF RIGHT FOOT: ICD-10-CM

## 2025-03-12 DIAGNOSIS — M21.42 BILATERAL PES PLANUS: ICD-10-CM

## 2025-03-12 DIAGNOSIS — M21.41 BILATERAL PES PLANUS: ICD-10-CM

## 2025-03-12 PROCEDURE — 97110 THERAPEUTIC EXERCISES: CPT | Mod: GP | Performed by: PHYSICAL THERAPIST

## 2025-03-12 NOTE — PROGRESS NOTES
"Physical Therapy  Physical Therapy Treatment    Patient Name: Cece Aguilar  MRN: [id number]  Today's Date: 3/12/2025  Time Calculation  Start Time: 1403  Stop Time: 1445  Time Calculation (min): 42 min    Visit # 2   Reason for visit: XI pes planus, posterior tibial tendon dysfunction, right foot OA  Referring MD: Jordan Calderon  Insurance:  MMO: NO AUTH/ $8300 DED (NM)/ 50% COVERAGE/ 20 VISIT MX/ $9200 OOP (NM)/ PER SARAH WITH MMO   DX: XI pes planus., right ankle pain, OA right foot, PPT dysfunction right   Frequency: 1 /week or every other week   Duration: 6-8 sessions    Current Problem  1. Posterior tibial tendon dysfunction (PTTD) of right lower extremity  Follow Up In Physical Therapy      2. Right ankle pain, unspecified chronicity  Follow Up In Physical Therapy      3. Bilateral pes planus  Follow Up In Physical Therapy      4. Arthritis of right foot  Follow Up In Physical Therapy          General     Precautions     Pain       Subjective:   Subjective   Patient reports no current pain, and feeling better.  Does have some pain/stiffness in the morning, but goes away once she gets moving.  Tried to cancel last visit in text.  I did go skiing last week.  The first day was rough as the trails were hard.  I skipped the next day, and then did some more skiing the following day.  Those trails were easier so it was better in the beginning. But then I stopped when it started to bother me.    HEP compliance - yes     Objective:   Objective   Pes planus xi     Treatments:  Stepper L3 x5'   Checked fit of ASO brace  Standing arch raise single 5\" x10 ea (Added to HEP)    Seated arch raise 5\" x10 (Added to HEP)    BTB squat w/ chair touch and good arch 2x10   BTB side steps 10' x 3 laps (Added to HEP)    Ankle TB PF/inversion x30 ea GTB  Bridge 2x15  Lateral dips 6\" 2x10 xi (Added to HEP)    Slant board 1'   Stad quad stretch 1' xi             Access Code: [email or web address]     Charges: 3 TE     - PT Therapeutic " Procedures Time Entry  Therapeutic Exercise Time Entry: 42 -     Assessment:    Tolerated session w/o reported increase in pain or problem.  Did report more challenge on right compared to left.    Plan:    Continue per pOC to increase arch control, knee control, strength and stability to allow return to prior level of function.  If patient feels she does not need to come next week she will cancel and follow up the following week.        Mauri Osegurea, PT

## 2025-03-19 ENCOUNTER — APPOINTMENT (OUTPATIENT)
Dept: PHYSICAL THERAPY | Facility: CLINIC | Age: 60
End: 2025-03-19
Payer: COMMERCIAL

## 2025-03-26 ENCOUNTER — TREATMENT (OUTPATIENT)
Dept: PHYSICAL THERAPY | Facility: CLINIC | Age: 60
End: 2025-03-26
Payer: COMMERCIAL

## 2025-03-26 DIAGNOSIS — M21.42 BILATERAL PES PLANUS: ICD-10-CM

## 2025-03-26 DIAGNOSIS — M25.571 RIGHT ANKLE PAIN, UNSPECIFIED CHRONICITY: ICD-10-CM

## 2025-03-26 DIAGNOSIS — M76.821 POSTERIOR TIBIAL TENDON DYSFUNCTION (PTTD) OF RIGHT LOWER EXTREMITY: ICD-10-CM

## 2025-03-26 DIAGNOSIS — M21.41 BILATERAL PES PLANUS: ICD-10-CM

## 2025-03-26 DIAGNOSIS — M19.071 ARTHRITIS OF RIGHT FOOT: ICD-10-CM

## 2025-03-26 PROCEDURE — 97110 THERAPEUTIC EXERCISES: CPT | Mod: GP | Performed by: PHYSICAL THERAPIST

## 2025-03-26 ASSESSMENT — PAIN - FUNCTIONAL ASSESSMENT: PAIN_FUNCTIONAL_ASSESSMENT: 0-10

## 2025-03-26 ASSESSMENT — PAIN SCALES - GENERAL: PAINLEVEL_OUTOF10: 3

## 2025-03-26 NOTE — PROGRESS NOTES
"Physical Therapy  Physical Therapy Treatment    Patient Name: Cece Aguilar  MRN: [id number]  Today's Date: 3/26/2025  Time Calculation  Start Time: 1350  Stop Time: 1435  Time Calculation (min): 45 min    Visit # 3   Reason for visit: XI pes planus, posterior tibial tendon dysfunction, right foot OA  Referring MD: Jordan Calderon  Insurance:  MMO: NO AUTH/ $8300 DED (NM)/ 50% COVERAGE/ 20 VISIT MX/ $9200 OOP (NM)/ PER SARAH WITH MMO   DX: XI pes planus., right ankle pain, OA right foot, PPT dysfunction right   Frequency: 1 /week or every other week   Duration: 6-8 sessions    Current Problem  1. Posterior tibial tendon dysfunction (PTTD) of right lower extremity  Follow Up In Physical Therapy      2. Right ankle pain, unspecified chronicity  Follow Up In Physical Therapy      3. Bilateral pes planus  Follow Up In Physical Therapy      4. Arthritis of right foot  Follow Up In Physical Therapy          General  Reason for Referral: XI pes planus, posterior tibial tendon dysfunction, right foot OA  Referred By: Jordan Calderon  Precautions  Precautions  STEADI Fall Risk Score (The score of 4 or more indicates an increased risk of falling): 0  Pain       Subjective:   Subjective   Patient reports pain is 2-3/10 right foot .  I tried to add Tennis back in and my pain increased.  Has not been using ice at home.  I start to feel better when I rest, but then my pain comes back when I get back into sport/etc.  Has played 5 times in the last 2 weeks.    HEP compliance - yes     Objective:   Objective   Pes planus xi     Ankle Strength MMT  DFL= 5/5 [] R= 5/5   PFL= 4+/5 [] R= 4+/5   InvL= 4-/5 [] R= 4-/5   EvL= 5/5 [] R= 5/5    Hip Strength MMT  Flex:L= 4+/5 [] R= 4/5   AbdL= 5-/5 [] R= 5-/5  AddL= 5/5 [] R= 5/5  ExtL= 4+/5 [] R= 4+/5    Single leg squat: L= good control, R= valgus collapse    Treatments:  Stepper L3 x5'     Standing arch raise single 5\" x10 ea    Seated arch raise 5\" x10     BTB squat w/ chair touch and " "good arch 3x10   BTB side steps 10' x 3 laps    Ankle TB PF GTB x30   Tband inversion x30 ea RTB  Bridge 2x15  Lateral dips 6\" 2x10 parth     Lunges x8 ea - pain when right is back   Heel raise - pain   Ankle board inv 7.5# x2'  Slant board 1'   Stand quad stretch 1' parth             Access Code: [email or web address]     Charges: 3 TE      - PT Therapeutic Procedures Time Entry  Therapeutic Exercise Time Entry: 45 -     Assessment:    Decreased to RTB for inversion as she was unable to perform full range with green.  Attempted to progress exercises but pain increased.  Patient may benefit from increased use of ice.      Plan:    Continue per pOC to increase arch control, knee control, strength and stability to allow return to prior level of function.  Re attempt exercise progression next visit.          Mauri Oseguera, PT  "

## 2025-04-02 ENCOUNTER — APPOINTMENT (OUTPATIENT)
Dept: PHYSICAL THERAPY | Facility: CLINIC | Age: 60
End: 2025-04-02
Payer: COMMERCIAL

## 2025-04-03 ENCOUNTER — TREATMENT (OUTPATIENT)
Dept: PHYSICAL THERAPY | Facility: CLINIC | Age: 60
End: 2025-04-03
Payer: COMMERCIAL

## 2025-04-03 DIAGNOSIS — M21.42 BILATERAL PES PLANUS: ICD-10-CM

## 2025-04-03 DIAGNOSIS — M25.571 RIGHT ANKLE PAIN, UNSPECIFIED CHRONICITY: ICD-10-CM

## 2025-04-03 DIAGNOSIS — M19.071 ARTHRITIS OF RIGHT FOOT: ICD-10-CM

## 2025-04-03 DIAGNOSIS — M76.821 POSTERIOR TIBIAL TENDON DYSFUNCTION (PTTD) OF RIGHT LOWER EXTREMITY: ICD-10-CM

## 2025-04-03 DIAGNOSIS — M21.41 BILATERAL PES PLANUS: ICD-10-CM

## 2025-04-03 PROCEDURE — 97112 NEUROMUSCULAR REEDUCATION: CPT | Mod: GP | Performed by: PHYSICAL THERAPIST

## 2025-04-03 PROCEDURE — 97110 THERAPEUTIC EXERCISES: CPT | Mod: GP | Performed by: PHYSICAL THERAPIST

## 2025-04-03 ASSESSMENT — PAIN SCALES - GENERAL: PAINLEVEL_OUTOF10: 0 - NO PAIN

## 2025-04-03 ASSESSMENT — PAIN - FUNCTIONAL ASSESSMENT: PAIN_FUNCTIONAL_ASSESSMENT: 0-10

## 2025-04-03 NOTE — PROGRESS NOTES
"Physical Therapy  Physical Therapy Treatment    Patient Name: Cece Aguilar  MRN: [id number]  Today's Date: 4/3/2025  Time Calculation  Start Time: 0745  Stop Time: 0830  Time Calculation (min): 45 min    Visit # 4   Reason for visit: XI pes planus, posterior tibial tendon dysfunction, right foot OA  Referring MD: Jordan Calderon  Insurance:  MMO: NO AUTH/ $8300 DED (NM)/ 50% COVERAGE/ 20 VISIT MX/ $9200 OOP (NM)/ PER SARAH WITH MMO   DX: XI pes planus., right ankle pain, OA right foot, PPT dysfunction right   Frequency: 1 /week or every other week   Duration: 6-8 sessions    Current Problem  1. Posterior tibial tendon dysfunction (PTTD) of right lower extremity  Follow Up In Physical Therapy      2. Right ankle pain, unspecified chronicity  Follow Up In Physical Therapy      3. Bilateral pes planus  Follow Up In Physical Therapy      4. Arthritis of right foot  Follow Up In Physical Therapy          General  Reason for Referral: XI pes planus, posterior tibial tendon dysfunction, right foot OA  Referred By: Jordan Calderon  Precautions  Precautions  STEADI Fall Risk Score (The score of 4 or more indicates an increased risk of falling): 0  Pain  Pain Assessment: 0-10  0-10 (Numeric) Pain Score: 0 - No pain    Subjective:   Subjective   Patient reports no pain, just stiffness.  I did ice after active things and it seems to help.  I have not been getting the swelling like I was.  I did play tennis yesterday and I plan to today.    HEP compliance - yes     Objective:   Objective   Pes planus xi     Ankle Strength MMT  DFL= 5/5 [] R= 5/5   PFL= 4+/5 [] R= 4+/5   InvL= 4-/5 [] R= 4-/5   EvL= 5/5 [] R= 5/5    Hip Strength MMT  Flex:L= 4+/5 [] R= 4/5   AbdL= 5-/5 [] R= 5-/5  AddL= 5/5 [] R= 5/5  ExtL= 4+/5 [] R= 4+/5    Single leg squat: L= good control, R= valgus collapse    Treatments:  Stepper L3 x5'   DBE 2' x2 way   Tandem  KB swing x10 cw/ccw   BOSU lunge x2' alternating  Lateral dips 6\" 3x10 xi    HR/TR x10 ea "   BTB squat on BOSU 3x10    BTB side steps 20' x 3 laps    Bridge 2x15 BTB at knee   Ankle board inv/ev  7.5# x2' (inc weight NV)   Slant board 1'   Stand quad stretch 1' parth             Access Code: [email or web address]     Charges: 2 TE, 1 NME       - PT Therapeutic Procedures Time Entry  Neuromuscular Re-Education Time Entry: 10  Therapeutic Exercise Time Entry: 35 -     Assessment:    Challenged with balance exercises but no reported increase in pain.  Did feel some discomfort with BOSU lunge, but not like last time with conventional lunge.  No pain with heel raise but reported slight cramping sensation.    Plan:    Continue per pOC to increase arch control, knee control, strength and stability to allow return to prior level of function.  Re attempt exercise progression next visit.          Mauri Oseguera, PT

## 2025-04-04 ENCOUNTER — OFFICE VISIT (OUTPATIENT)
Dept: ORTHOPEDIC SURGERY | Facility: CLINIC | Age: 60
End: 2025-04-04
Payer: COMMERCIAL

## 2025-04-04 VITALS — HEIGHT: 69 IN | WEIGHT: 127 LBS | BODY MASS INDEX: 18.81 KG/M2

## 2025-04-04 DIAGNOSIS — M21.42 BILATERAL PES PLANUS: ICD-10-CM

## 2025-04-04 DIAGNOSIS — M76.821 POSTERIOR TIBIAL TENDON DYSFUNCTION (PTTD) OF RIGHT LOWER EXTREMITY: Primary | ICD-10-CM

## 2025-04-04 DIAGNOSIS — M21.41 BILATERAL PES PLANUS: ICD-10-CM

## 2025-04-04 PROCEDURE — 3008F BODY MASS INDEX DOCD: CPT | Performed by: STUDENT IN AN ORGANIZED HEALTH CARE EDUCATION/TRAINING PROGRAM

## 2025-04-04 PROCEDURE — 99212 OFFICE O/P EST SF 10 MIN: CPT | Performed by: STUDENT IN AN ORGANIZED HEALTH CARE EDUCATION/TRAINING PROGRAM

## 2025-04-04 ASSESSMENT — PAIN DESCRIPTION - DESCRIPTORS: DESCRIPTORS: ACHING

## 2025-04-04 ASSESSMENT — PAIN SCALES - GENERAL: PAINLEVEL_OUTOF10: 0 - NO PAIN

## 2025-04-04 ASSESSMENT — PAIN - FUNCTIONAL ASSESSMENT: PAIN_FUNCTIONAL_ASSESSMENT: 0-10

## 2025-04-04 NOTE — PROGRESS NOTES
ORTHOPAEDIC SURGERY OUTPATIENT PROGRESS NOTE    Chief Complaint:  Right foot and ankle pain    History Of Present Illness  Cece Aguilar is a 59 y.o. female who presents for evaluation of right foot and ankle pain as a referral from Dr. Guillory.  Patient has been quite active, previously running half marathons.  She continues to participate in racquet sports including tennis as well as paddle and CrossFit.  Unfortunately, last spring she had to stop running due to pain.  She attempted to run in July 2024 but was unable to due to pain.  She has been following with a chiropractic sports medicine provider, Rey Bloom who assisted her in controlling her swelling and pain.  She has been trying to build back more for activities but continues to notice worsening pain with running as well as lunges.  She continues with 3 out of 10 pain, this is worse with higher impact activity.  She obtained new shoes in December and has previously been prescribed custom fabricated orthotics approximately 5 years ago and has been taking meloxicam of late with some improvement in her symptoms overall.  Patient has not had any formal HEP, PT or CSI.  She has been using orthotics, no braces or inserts in her shoes.    04/04/2025: Patient returns for follow-up of her right foot pain as above.  She has been in physical therapy.  She is having no pain today but does notice some increase in her pain approximately 45 minutes into paddle.  She is transitioning to the tennis season.  She has cut back on some of her activities and noticed improvement in her symptoms overall.      Past Medical History  Past Medical History:   Diagnosis Date    Melanoma (Multi)        Surgical History  Recent Surgeries in Orthopaedic Surgery            No cases to display             Social History  Social History     Socioeconomic History    Marital status:    Tobacco Use    Smoking status: Never   Substance and Sexual Activity    Alcohol use: Yes      Alcohol/week: 14.0 standard drinks of alcohol     Types: 14 Glasses of wine per week    Drug use: Never     Social Drivers of Health     Financial Resource Strain: Low Risk  (4/26/2022)    Received from [facility]    Overall Financial Resource Strain (CARDIA)     Difficulty of Paying Living Expenses: Not hard at all   Food Insecurity: No Food Insecurity (4/26/2022)    Received from [facility]    Hunger Vital Sign     Worried About Running Out of Food in the Last Year: Never true     Ran Out of Food in the Last Year: Never true   Transportation Needs: No Transportation Needs (4/26/2022)    Received from [facility]    PRAPARE - Transportation     Lack of Transportation (Medical): No     Lack of Transportation (Non-Medical): No   Physical Activity: Sufficiently Active (4/26/2022)    Received from [facility]    Exercise Vital Sign     Days of Exercise per Week: 6 days     Minutes of Exercise per Session: 30 min   Stress: No Stress Concern Present (4/26/2022)    Received from [facility] - Occupational Stress Questionnaire     Feeling of Stress : Not at all   Social Connections: Unknown (4/26/2022)    Received from [facility]    Social Connection and Isolation Panel [NHANES]     Frequency of Communication with Friends and Family: More than three times a week     Frequency of Social Gatherings with Friends and Family: More than three times a week     Attends Voodoo Services: Patient declined     Active Member of Clubs or Organizations: Yes     Attends Club or Organization Meetings: More than 4 times per year     Marital Status:    Housing Stability: Low Risk  (4/26/2022)    Received from [facility]    Housing Stability Vital Sign     Unable to Pay for Housing in the Last Year: No     Number of Places  Lived in the Last Year: 1     Unstable Housing in the Last Year: No       Family History  Family History   Problem Relation Name Age of Onset    Melanoma Mother      Hyperlipidemia Mother      Melanoma Father      Hyperlipidemia Father          Allergies  Allergies   Allergen Reactions    Insect Venom Swelling       Review of Systems  REVIEW OF SYSTEMS  Constitutional: no unplanned weight loss  Psychiatric: no suicidal ideation  ENT: no vision changes, no sinus problems  Pulmonary: no shortness of breath  Lymphatic: no enlarged lymph nodes  Cardiovascular: no chest pain or shortness of breath  Gastrointestinal: no stomach problems  Genitourinary: no dysuria   Skin: no rashes  Endocrine: no thyroid problems  Neurological: no headache, no numbness  Hematological: no easy bruising  Musculoskeletal: Right foot and ankle pain     Physical Exam  PHYSICAL EXAMINATION  Constitutional Exam: well developed and well nourished  Psychiatric Exam: alert and oriented, appropriate mood and behavior  Eye Exam: EOMI  Pulmonary Exam: breathing non-labored, no apparent distress  Lymphatic exam: no appreciable lymphadenopathy in the lower extremities  Cardiovascular exam: RRR to peripheral palpation, DP pulses 2+, PT 2+, toes are pink with good capillary refill, no pitting edema  Skin exam: no open lesions, rashes, abrasions or ulcerations  Neurological exam: sensation to light touch intact in both lower extremities in peripheral and dermatomal distributions (except for any abnormalities noted in musculoskeletal exam)    Musculoskeletal exam: Right lower extremity examination.  Patient pain continues localized primarily about the PTT.  Remains nontender to palpation at the sinus Tarsi.  There is no ankle effusion on examination today. Patient has pain-free and supple ankle range of motion without crepitus.  subtalar and midtarsal joint range of motion supple and pain-free.  Patient has greater than physiologic hindfoot valgus with pes  "planus arch posture and too many toes sign evident.  Patient has sensation grossly intact to light touch in a saphenous, sural, superficial peroneal, deep peroneal and tibial nerve distribution.  She is intact plantarflexion, dorsiflexion and EHL, she has 4- out of 5 strength in inversion with pain and 5 out of 5 strength in eversion without pain.  She has 2+ DP/PT pulses palpated.    Last Recorded Vitals  Height 1.753 m (5' 9\"), weight 57.6 kg (127 lb).    Laboratory Results  No results found for this or any previous visit (from the past 24 hours).     Radiology Results  Previously reviewed imaging:  X-ray imaging 3 view weightbearing right foot and ankle reviewed and independently evaluated by me on 02/03/2025 demonstrates no acute fracture or dislocation.  Ankle mortise appears well aligned, there is no obvious talar tilt or medial clear space widening.  AP foot projection suggests increased kites angle and possible os navicularis, lateral foot suggest pes planus posture with perhaps mild TN OA with dorsal osteophytosis.    Assessment/Plan:  59-year-old female who my impression has right foot pain likely secondary to progressive collapsing foot deformity with features of pes planus posture, acquired pronation deformity, PTTD as well as left pes planus posture and acquired pronation deformity.  I have reviewed the diagnosis and treatment options extensively with the patient.  I would recommend the patient continue weightbearing to her tolerance in her bilateral lower extremities.  I have no formal restrictions for her.  She should continue with her orthotic use and may transition to an HEP.  I again discussed with the patient and from a surgical treatment standpoint if she were to fail an exhaustive and appropriate course of nonoperative treatment that she may ultimately consider a right flatfoot reconstruction, likely through a joint sparing approach pending clinical and radiographic review.  I would plan to see " the patient back no later than 6 months for repeat clinical and radiographic evaluation.  I have encouraged the patient to contact the office if she develops any new pain, worsening symptoms or any change in alignment or worsening appearance of her foot.  Upon return, patient would require three-view weightbearing right foot and ankle.    Jordan Calderon MD, MANUEL  Department of Orthopaedic Surgery  [facility]    The diagnosis and treatment plan were reviewed with the patient. All questions were answered. The patient verbalized understanding of the treatment plan. There were no barriers to understanding identified.    Note dictated with [employer] software.  Completed without full type editing and sent to avoid delay.

## 2025-04-09 ENCOUNTER — TREATMENT (OUTPATIENT)
Dept: PHYSICAL THERAPY | Facility: CLINIC | Age: 60
End: 2025-04-09
Payer: COMMERCIAL

## 2025-04-09 DIAGNOSIS — M21.41 BILATERAL PES PLANUS: ICD-10-CM

## 2025-04-09 DIAGNOSIS — M21.42 BILATERAL PES PLANUS: ICD-10-CM

## 2025-04-09 DIAGNOSIS — M76.821 POSTERIOR TIBIAL TENDON DYSFUNCTION (PTTD) OF RIGHT LOWER EXTREMITY: Primary | ICD-10-CM

## 2025-04-09 DIAGNOSIS — M19.071 ARTHRITIS OF RIGHT FOOT: ICD-10-CM

## 2025-04-09 DIAGNOSIS — M25.571 RIGHT ANKLE PAIN, UNSPECIFIED CHRONICITY: ICD-10-CM

## 2025-04-09 PROCEDURE — 97110 THERAPEUTIC EXERCISES: CPT | Mod: GP | Performed by: PHYSICAL THERAPIST

## 2025-04-09 PROCEDURE — 97140 MANUAL THERAPY 1/> REGIONS: CPT | Mod: GP | Performed by: PHYSICAL THERAPIST

## 2025-04-09 NOTE — PROGRESS NOTES
"Physical Therapy  Physical Therapy Treatment    Patient Name: Cece Aguilar  MRN: [id number]  Today's Date: 4/9/2025  Time Calculation  Start Time: 1445  Stop Time: 1530  Time Calculation (min): 45 min    Visit # 5   Reason for visit: XI pes planus, posterior tibial tendon dysfunction, right foot OA  Referring MD: Jordan Calderon  Insurance:  MMO: NO AUTH/ $8300 DED (NM)/ 50% COVERAGE/ 20 VISIT MX/ $9200 OOP (NM)/ PER SARAH WITH MMO   DX: XI pes planus., right ankle pain, OA right foot, PPT dysfunction right   Frequency: 1 /week or every other week   Duration: 6-8 sessions    Current Problem  1. Posterior tibial tendon dysfunction (PTTD) of right lower extremity  Follow Up In Physical Therapy      2. Right ankle pain, unspecified chronicity  Follow Up In Physical Therapy      3. Bilateral pes planus  Follow Up In Physical Therapy      4. Arthritis of right foot  Follow Up In Physical Therapy            General     Precautions     Pain       Subjective:   Subjective   Patient reports feeling pretty good today 0-1/10 pain.  Played tennis last Thursday, but not since, as Mom has been in the hospital.  Also did not do as much exercise, due to being out of town with her.  HEP compliance - yes     Objective:   Objective   Pes planus xi     Ankle Strength MMT  DFL= 5/5 [] R= 5/5   PFL= 4+/5 [] R= 4+/5   InvL= 4-/5 [] R= 4-/5   EvL= 5/5 [] R= 5/5    Hip Strength MMT  Flex:L= 4+/5 [] R= 4/5   AbdL= 5-/5 [] R= 5-/5  AddL= 5/5 [] R= 5/5  ExtL= 4+/5 [] R= 4+/5    Single leg squat: L= good control, R= valgus collapse    Treatments:  Stepper L3 x5'   DBE 2' x2 way   Tandem  KB swing on AIREX  x10 cw/ccw   BOSU lunge x2' alternating  Runner step up 8\" x20 xi   Lateral dips 8\" 2x10 xi    Fwd step down/tap 6\" 2x10 xi   Fwd step down 6\" 2x10 (Added to HEP)    HR/TR x20 ea ball between heels    BTB side steps 20' x 3 laps  - with squat   Bridge 2x15 BTB at knee   Ankle board inv/ev  7.5# x2' (inc weight NV)   Slant board 1' "   Stand quad stretch 1' parth             Access Code: [email or web address]     Charges: 2 TE, 1 NME       - PT Therapeutic Procedures Time Entry  Manual Therapy Time Entry: 10  Therapeutic Exercise Time Entry: 35 -     Assessment:    Still does report some pain in lunge with right foot back.  States that she is improving and feels benefit from coming to therapy.  Remains a good candidate for skilled therapy to address limitations and improve function in ADL's and recreational activities.    Plan:    Continue per POC to increase arch control, knee control, strength and stability to allow return to prior level of function.   Adding 3 more visits every 2-3 weeks.        Mauri Oseguera, PT

## 2025-04-22 ENCOUNTER — TREATMENT (OUTPATIENT)
Dept: PHYSICAL THERAPY | Facility: CLINIC | Age: 60
End: 2025-04-22
Payer: COMMERCIAL

## 2025-04-22 DIAGNOSIS — M25.571 RIGHT ANKLE PAIN, UNSPECIFIED CHRONICITY: ICD-10-CM

## 2025-04-22 DIAGNOSIS — M21.42 BILATERAL PES PLANUS: ICD-10-CM

## 2025-04-22 DIAGNOSIS — M21.41 BILATERAL PES PLANUS: ICD-10-CM

## 2025-04-22 DIAGNOSIS — M76.821 POSTERIOR TIBIAL TENDON DYSFUNCTION (PTTD) OF RIGHT LOWER EXTREMITY: ICD-10-CM

## 2025-04-22 DIAGNOSIS — M19.071 ARTHRITIS OF RIGHT FOOT: ICD-10-CM

## 2025-04-22 PROCEDURE — 97110 THERAPEUTIC EXERCISES: CPT | Mod: GP | Performed by: PHYSICAL THERAPIST

## 2025-04-22 PROCEDURE — 97112 NEUROMUSCULAR REEDUCATION: CPT | Mod: GP | Performed by: PHYSICAL THERAPIST

## 2025-04-22 ASSESSMENT — PAIN SCALES - GENERAL: PAINLEVEL_OUTOF10: 2

## 2025-04-22 ASSESSMENT — PAIN - FUNCTIONAL ASSESSMENT: PAIN_FUNCTIONAL_ASSESSMENT: 0-10

## 2025-04-22 NOTE — PROGRESS NOTES
Physical Therapy  Physical Therapy Treatment    Patient Name: Cece Aguilar  MRN: [id number]  Today's Date: 4/22/2025  Time Calculation  Start Time: 1130  Stop Time: 1215  Time Calculation (min): 45 min    Visit # 6   Reason for visit: XI pes planus, posterior tibial tendon dysfunction, right foot OA  Referring MD: Jordan Calderon  Insurance:  MMO: NO AUTH/ $8300 DED (NM)/ 50% COVERAGE/ 20 VISIT MX/ $9200 OOP (NM)/ PER SARAH WITH MMO   DX: XI pes planus., right ankle pain, OA right foot, PPT dysfunction right   Frequency: 1 /week or every other week   Duration: 6-8 sessions    Current Problem  1. Posterior tibial tendon dysfunction (PTTD) of right lower extremity  Follow Up In Physical Therapy      2. Right ankle pain, unspecified chronicity  Follow Up In Physical Therapy      3. Bilateral pes planus  Follow Up In Physical Therapy      4. Arthritis of right foot  Follow Up In Physical Therapy            General  Reason for Referral: XI pes planus, posterior tibial tendon dysfunction, right foot OA  Referred By: Jordan Calderon  Precautions  Precautions  STEADI Fall Risk Score (The score of 4 or more indicates an increased risk of falling): 0  Pain  Pain Assessment: 0-10  0-10 (Numeric) Pain Score: 2    Subjective:   Subjective   Patient reports fa little pain today right foot 1-2/10.    HEP compliance - yes     Objective:   Objective   Pes planus xi     Ankle Strength MMT  DFL= 5/5 [] R= 5/5   PFL= 4+/5 [] R= 4+/5   InvL= 4-/5 [] R= 4-/5   EvL= 5/5 [] R= 5/5    Hip Strength MMT  Flex:L= 4+/5 [] R= 4/5   AbdL= 5-/5 [] R= 5-/5  AddL= 5/5 [] R= 5/5  ExtL= 4+/5 [] R= 4+/5    Single leg squat: L= good control, R= valgus collapse    Treatments:  Stepper L3 x5'   DBE 2' x2 way   Tandem  KB swing on AIREX  x10 cw/ccw   SLS AIREX 1' xi   BOSU lunge x2' alternating  Runner step up BOSU  x20 xi   BOSU step over lat x30   BOSU fwd step over/back x20 ea   Heel walk/toe walk 30' x 2 ea   Lat shuffle 30' x 3 laps   Nia  "30' x 2 laps   Eccentric toe raise (favoring one side ) x10 patrh   Lateral dips 8\" 2x10 parth    Fwd step down/tap 6\" 3x10 parth   BTB side steps 20' x 3 laps  - with squat     Slant board 1'   Stand quad stretch 1' parth             Access Code: [email or web address]     Charges: 2 TE, 1 NME       - PT Therapeutic Procedures Time Entry  Neuromuscular Re-Education Time Entry: 10  Therapeutic Exercise Time Entry: 35 -     Assessment:    Lat shuffle tolerated without pain, but did have some right foot pain with kareoke and toe walking.    Plan:    Continue per POC to increase arch control, knee control, strength and stability to allow return to prior level of function.   Per extended POC 1 visit next week then  3 more visits every 2-3 weeks.        Mauri Oseguera, PT  "

## 2025-04-28 ENCOUNTER — TREATMENT (OUTPATIENT)
Dept: PHYSICAL THERAPY | Facility: CLINIC | Age: 60
End: 2025-04-28
Payer: COMMERCIAL

## 2025-04-28 DIAGNOSIS — M19.071 ARTHRITIS OF RIGHT FOOT: ICD-10-CM

## 2025-04-28 DIAGNOSIS — M21.41 BILATERAL PES PLANUS: ICD-10-CM

## 2025-04-28 DIAGNOSIS — M21.42 BILATERAL PES PLANUS: ICD-10-CM

## 2025-04-28 DIAGNOSIS — M76.821 POSTERIOR TIBIAL TENDON DYSFUNCTION (PTTD) OF RIGHT LOWER EXTREMITY: ICD-10-CM

## 2025-04-28 DIAGNOSIS — M25.571 RIGHT ANKLE PAIN, UNSPECIFIED CHRONICITY: ICD-10-CM

## 2025-04-28 PROCEDURE — 97112 NEUROMUSCULAR REEDUCATION: CPT | Mod: GP | Performed by: PHYSICAL THERAPIST

## 2025-04-28 PROCEDURE — 97110 THERAPEUTIC EXERCISES: CPT | Mod: GP | Performed by: PHYSICAL THERAPIST

## 2025-04-28 ASSESSMENT — PAIN SCALES - GENERAL: PAINLEVEL_OUTOF10: 0 - NO PAIN

## 2025-04-28 ASSESSMENT — PAIN - FUNCTIONAL ASSESSMENT: PAIN_FUNCTIONAL_ASSESSMENT: 0-10

## 2025-04-28 NOTE — PROGRESS NOTES
Physical Therapy  Physical Therapy Treatment    Patient Name: Cece Aguilar  MRN: [id number]  Today's Date: 4/28/2025  Time Calculation  Start Time: 1615  Stop Time: 1700  Time Calculation (min): 45 min    Visit # 7   Reason for visit: XI pes planus, posterior tibial tendon dysfunction, right foot OA  Referring MD: Jordan Calderon  Insurance:  MMO: NO AUTH/ $8300 DED (NM)/ 50% COVERAGE/ 20 VISIT MX/ $9200 OOP (NM)/ PER SARAH WITH MMO   DX: XI pes planus., right ankle pain, OA right foot, PPT dysfunction right   Frequency: 1 /week or every other week   Duration: 6-8 sessions    Current Problem  1. Posterior tibial tendon dysfunction (PTTD) of right lower extremity  Follow Up In Physical Therapy      2. Right ankle pain, unspecified chronicity  Follow Up In Physical Therapy      3. Bilateral pes planus  Follow Up In Physical Therapy      4. Arthritis of right foot  Follow Up In Physical Therapy              General  Reason for Referral: XI pes planus, posterior tibial tendon dysfunction, right foot OA  Referred By: Jordan Calderon  Precautions  Precautions  STEADI Fall Risk Score (The score of 4 or more indicates an increased risk of falling): 0  Pain  Pain Assessment: 0-10  0-10 (Numeric) Pain Score: 0 - No pain    Subjective:   Subjective   Patient reports no current pain.  Did have some this morning doing some exercise before going to the gym.    HEP compliance - yes     Objective:   Objective   Pes planus xi     Ankle Strength MMT  DFL= 5/5 [] R= 5/5   PFL= 4+/5 [] R= 4+/5   InvL= 4-/5 [] R= 4-/5   EvL= 5/5 [] R= 5/5    Hip Strength MMT  Flex:L= 4+/5 [] R= 4/5   AbdL= 5-/5 [] R= 5-/5  AddL= 5/5 [] R= 5/5  ExtL= 4+/5 [] R= 4+/5    Single leg squat: L= good control, R= valgus collapse    Treatments:  Stepper L3 x5'   DBE 2' x2 way   Tandem  rebounder throw x20 xi   Tandem 1/2 roll x1' xi SLS AIREX 1' xi   1/2 roll fwd x1'   SLS rebounder x20 xi   Runner step up BOSU  x20 xi   BOSU step over lat 2x20   BOSU  "fwd step over/back x20 ea   Heel walk/toe walk 30' x 2 ea   Cybex toe press 80# 3 way x20 ea   Lat shuffle 30' x 3 laps   Kareoke  -not performed  - pain   Eccentric toe raise (favoring one side ) x10 parth -not performed    Lateral dips 8\" 3x10 parth    Black TB side steps 20' x 3 laps  - with squat     Slant board 1'   Stand quad stretch 1' parth             Access Code: [email or web address]     Charges: 2 TE, 1 NME       - PT Therapeutic Procedures Time Entry  Neuromuscular Re-Education Time Entry: 10  Therapeutic Exercise Time Entry: 35 -     Assessment:    Did have some pain with heel walking attempt, but seemed less intense than prior attempts.  Toe presses on cybex tolerated well.    Plan:    Continue per POC to increase arch control, knee control, strength and stability to allow return to prior level of function.   Per extended POC 1 visit next week then  3 more visits every 2-3 weeks.        Mauri Oseguera, PT  "

## 2025-05-14 ENCOUNTER — APPOINTMENT (OUTPATIENT)
Dept: PHYSICAL THERAPY | Facility: CLINIC | Age: 60
End: 2025-05-14
Payer: COMMERCIAL

## 2025-05-28 ENCOUNTER — APPOINTMENT (OUTPATIENT)
Dept: PHYSICAL THERAPY | Facility: CLINIC | Age: 60
End: 2025-05-28
Payer: COMMERCIAL

## 2025-05-30 ENCOUNTER — OFFICE VISIT (OUTPATIENT)
Dept: PRIMARY CARE | Facility: CLINIC | Age: 60
End: 2025-05-30
Payer: COMMERCIAL

## 2025-05-30 VITALS
DIASTOLIC BLOOD PRESSURE: 76 MMHG | TEMPERATURE: 101 F | HEART RATE: 92 BPM | OXYGEN SATURATION: 96 % | SYSTOLIC BLOOD PRESSURE: 120 MMHG

## 2025-05-30 DIAGNOSIS — J06.9 VIRAL UPPER RESPIRATORY TRACT INFECTION: Primary | ICD-10-CM

## 2025-05-30 PROCEDURE — 99213 OFFICE O/P EST LOW 20 MIN: CPT | Performed by: INTERNAL MEDICINE

## 2025-05-30 ASSESSMENT — ENCOUNTER SYMPTOMS
SORE THROAT: 0
NAUSEA: 0
FEVER: 1
HEADACHES: 0

## 2025-05-30 NOTE — PROGRESS NOTES
Patient is here with a 6-day history of an upper respiratory infection.  Patient began feeling sick over the weekend.  She was at a large gathering of many people who had small children.  She did not have any obvious contact with anyone who is sick.  That night she developed intense diarrhea without nausea or vomiting.  Those symptoms resolved and she has developed generalized fatigue and over the last several days has developed a severe sore throat.  She spiked a fever of 103 last night without shaking chills.  This morning her fever is 101.  She denies any cough or shortness of breath she has no neck stiffness or GI symptoms.  She has no  symptoms.  She has some mild myalgias but no severe body aches.  She did have a moderately severe headache last night but that has resolved pretty much with a couple of Tylenol.  She does still a little mild headache today but no photophobia.    Temperature is 101 pulse 92 and regular blood pressure is normal.  HEENT ears are clear throat is minimally injected  Neck: No cervical adenopathy  Lungs: Clear to auscultation percussion  Cardiac: No murmurs are noted  Skin no skin rashes.  Rapid Strep was negative    Most likely a viral syndrome however given her high fever we will check a strep test.  No other obvious source of infection on current exam.  She has no GI symptoms and has had frequent UTIs in the past and is sure she has no UTI symptoms currently.

## 2025-06-11 ENCOUNTER — APPOINTMENT (OUTPATIENT)
Dept: PHYSICAL THERAPY | Facility: CLINIC | Age: 60
End: 2025-06-11
Payer: COMMERCIAL

## 2025-07-14 ENCOUNTER — APPOINTMENT (OUTPATIENT)
Dept: OBSTETRICS AND GYNECOLOGY | Facility: CLINIC | Age: 60
End: 2025-07-14
Payer: COMMERCIAL

## 2025-08-06 NOTE — PROGRESS NOTES
8/8/2025 CC: 59 y.o. presents for glaucoma evaluation.    Past ocular history: Traumatic hyphema OS  Family history: none  Past medical history: As per chart    Social history: denies tobacco     Eye medications:   Left eye: PF qid, Cosopt bid, Dorzolamide tid, atropine bid    Allergy:  As per chart      Testing:      OCT 8/8/2025: OD- bdl I, avg 85. OS- full. GCA: full OU. Mac: Regular macular contour OU    Pachymetry 8/8/2025: 599/630    Gonioscopy 8/8/2025: open OU, wider OS, blood tinged inferiorly.    Tmax: 45 OS    Assessment:   Traumatic hyphema OS  - FU [facility]  - Blunt trauma (tennis ball) 8/2/2025  - 8/8/2025: resolved hyphema, IOP 10.     RE  - Myopia, presbyopia    Plan:   I explained my findings. Resolved traumatic hyphema OS.    Eye medications:   Left eye: Taper PF, continue Dorzolamide tid. Stop atropine and Cosopt     Return in 2 wks, IOP check/gonio

## 2025-08-07 ENCOUNTER — TELEPHONE (OUTPATIENT)
Dept: OBSTETRICS AND GYNECOLOGY | Facility: CLINIC | Age: 60
End: 2025-08-07
Payer: COMMERCIAL

## 2025-08-08 ENCOUNTER — OFFICE VISIT (OUTPATIENT)
Dept: OPHTHALMOLOGY | Facility: CLINIC | Age: 60
End: 2025-08-08
Payer: COMMERCIAL

## 2025-08-08 DIAGNOSIS — H53.8 BLURRY VISION: ICD-10-CM

## 2025-08-08 DIAGNOSIS — H21.02 HYPHEMA, LEFT EYE: Primary | ICD-10-CM

## 2025-08-08 DIAGNOSIS — H40.052 OCULAR HYPERTENSION OF LEFT EYE: ICD-10-CM

## 2025-08-08 PROCEDURE — 99204 OFFICE O/P NEW MOD 45 MIN: CPT | Performed by: OPHTHALMOLOGY

## 2025-08-08 PROCEDURE — 92133 CPTRZD OPH DX IMG PST SGM ON: CPT | Performed by: OPHTHALMOLOGY

## 2025-08-08 PROCEDURE — 76514 ECHO EXAM OF EYE THICKNESS: CPT | Performed by: OPHTHALMOLOGY

## 2025-08-08 PROCEDURE — 92020 GONIOSCOPY: CPT | Performed by: OPHTHALMOLOGY

## 2025-08-08 ASSESSMENT — ENCOUNTER SYMPTOMS
GASTROINTESTINAL NEGATIVE: 0
EYES NEGATIVE: 1
HEMATOLOGIC/LYMPHATIC NEGATIVE: 0
CARDIOVASCULAR NEGATIVE: 0
NEUROLOGICAL NEGATIVE: 0
RESPIRATORY NEGATIVE: 0
PSYCHIATRIC NEGATIVE: 0
ENDOCRINE NEGATIVE: 0
CONSTITUTIONAL NEGATIVE: 0
ALLERGIC/IMMUNOLOGIC NEGATIVE: 0
MUSCULOSKELETAL NEGATIVE: 0

## 2025-08-08 ASSESSMENT — PACHYMETRY
OD_CT(UM): 599
OS_CT(UM): 630

## 2025-08-08 ASSESSMENT — GONIOSCOPY
OS_INFERIOR: CBB
OS_NASAL: CBB
OD_SUPERIOR: CBB
OD_TEMPORAL: CBB
OS_TEMPORAL: CBB
OD_INFERIOR: CBB
OS_SUPERIOR: CBB
OD_NASAL: CBB

## 2025-08-08 ASSESSMENT — CUP TO DISC RATIO
OS_RATIO: 0.2
OD_RATIO: 0.2

## 2025-08-08 ASSESSMENT — TONOMETRY
OS_IOP_MMHG: 10
IOP_METHOD: GOLDMANN APPLANATION
OD_IOP_MMHG: 14

## 2025-08-08 ASSESSMENT — CONF VISUAL FIELD
OS_INFERIOR_TEMPORAL_RESTRICTION: 0
OD_INFERIOR_TEMPORAL_RESTRICTION: 0
OS_INFERIOR_NASAL_RESTRICTION: 0
OS_NORMAL: 1
OD_SUPERIOR_TEMPORAL_RESTRICTION: 0
OD_SUPERIOR_NASAL_RESTRICTION: 0
OS_SUPERIOR_NASAL_RESTRICTION: 0
METHOD: COUNTING FINGERS
OD_INFERIOR_NASAL_RESTRICTION: 0
OS_SUPERIOR_TEMPORAL_RESTRICTION: 0
OD_NORMAL: 1

## 2025-08-08 ASSESSMENT — REFRACTION_WEARINGRX
OS_SPHERE: -4.75
OD_SPHERE: -3.00
OD_AXIS: 108
OS_CYLINDER: SPHERE
OD_CYLINDER: -0.75

## 2025-08-08 ASSESSMENT — VISUAL ACUITY
OS_PH_CC: 20/30
OD_CC: 20/20
METHOD: SNELLEN - LINEAR
OS_CC+: +2
CORRECTION_TYPE: GLASSES
OS_CC: 20/60

## 2025-08-08 ASSESSMENT — SLIT LAMP EXAM - LIDS
COMMENTS: NORMAL
COMMENTS: NORMAL

## 2025-08-08 ASSESSMENT — EXTERNAL EXAM - LEFT EYE: OS_EXAM: NORMAL

## 2025-08-08 ASSESSMENT — EXTERNAL EXAM - RIGHT EYE: OD_EXAM: NORMAL

## 2025-08-11 ENCOUNTER — APPOINTMENT (OUTPATIENT)
Dept: OBSTETRICS AND GYNECOLOGY | Facility: CLINIC | Age: 60
End: 2025-08-11
Payer: COMMERCIAL

## 2025-08-11 VITALS
BODY MASS INDEX: 18.81 KG/M2 | WEIGHT: 127 LBS | SYSTOLIC BLOOD PRESSURE: 116 MMHG | HEIGHT: 69 IN | DIASTOLIC BLOOD PRESSURE: 70 MMHG

## 2025-08-11 DIAGNOSIS — Z12.31 VISIT FOR SCREENING MAMMOGRAM: ICD-10-CM

## 2025-08-11 DIAGNOSIS — N95.2 ATROPHIC VAGINITIS: ICD-10-CM

## 2025-08-11 DIAGNOSIS — Z01.419 WELL WOMAN EXAM WITH ROUTINE GYNECOLOGICAL EXAM: Primary | ICD-10-CM

## 2025-08-11 PROCEDURE — 99386 PREV VISIT NEW AGE 40-64: CPT | Performed by: OBSTETRICS & GYNECOLOGY

## 2025-08-11 PROCEDURE — 3008F BODY MASS INDEX DOCD: CPT | Performed by: OBSTETRICS & GYNECOLOGY

## 2025-08-11 PROCEDURE — 87626 HPV SEP HI-RSK TYP&POOL RSLT: CPT

## 2025-08-11 PROCEDURE — 1036F TOBACCO NON-USER: CPT | Performed by: OBSTETRICS & GYNECOLOGY

## 2025-08-11 RX ORDER — ATROPINE SULFATE 10 MG/ML
SOLUTION/ DROPS OPHTHALMIC
COMMUNITY

## 2025-08-11 RX ORDER — ESTRADIOL 0.1 MG/G
CREAM VAGINAL
COMMUNITY
Start: 2025-04-20

## 2025-08-11 RX ORDER — BRIMONIDINE TARTRATE 2 MG/ML
SOLUTION/ DROPS OPHTHALMIC
COMMUNITY

## 2025-08-11 RX ORDER — DORZOLAMIDE HYDROCHLORIDE AND TIMOLOL MALEATE 20; 5 MG/ML; MG/ML
SOLUTION/ DROPS OPHTHALMIC
COMMUNITY

## 2025-08-11 SDOH — ECONOMIC STABILITY: TRANSPORTATION INSECURITY
IN THE PAST 12 MONTHS, HAS THE LACK OF TRANSPORTATION KEPT YOU FROM MEDICAL APPOINTMENTS OR FROM GETTING MEDICATIONS?: NO

## 2025-08-11 SDOH — ECONOMIC STABILITY: FOOD INSECURITY: WITHIN THE PAST 12 MONTHS, YOU WORRIED THAT YOUR FOOD WOULD RUN OUT BEFORE YOU GOT MONEY TO BUY MORE.: NEVER TRUE

## 2025-08-11 SDOH — ECONOMIC STABILITY: FOOD INSECURITY: WITHIN THE PAST 12 MONTHS, THE FOOD YOU BOUGHT JUST DIDN'T LAST AND YOU DIDN'T HAVE MONEY TO GET MORE.: NEVER TRUE

## 2025-08-11 SDOH — ECONOMIC STABILITY: INCOME INSECURITY: IN THE LAST 12 MONTHS, WAS THERE A TIME WHEN YOU WERE NOT ABLE TO PAY THE MORTGAGE OR RENT ON TIME?: NO

## 2025-08-11 SDOH — ECONOMIC STABILITY: TRANSPORTATION INSECURITY
IN THE PAST 12 MONTHS, HAS LACK OF TRANSPORTATION KEPT YOU FROM MEETINGS, WORK, OR FROM GETTING THINGS NEEDED FOR DAILY LIVING?: NO

## 2025-08-11 ASSESSMENT — PATIENT HEALTH QUESTIONNAIRE - PHQ9
SUM OF ALL RESPONSES TO PHQ9 QUESTIONS 1 AND 2: 0
2. FEELING DOWN, DEPRESSED OR HOPELESS: NOT AT ALL
1. LITTLE INTEREST OR PLEASURE IN DOING THINGS: NOT AT ALL

## 2025-08-11 ASSESSMENT — LIFESTYLE VARIABLES
HOW MANY STANDARD DRINKS CONTAINING ALCOHOL DO YOU HAVE ON A TYPICAL DAY: PATIENT DOES NOT DRINK
SKIP TO QUESTIONS 9-10: 1
HOW OFTEN DO YOU HAVE A DRINK CONTAINING ALCOHOL: NEVER
AUDIT-C TOTAL SCORE: 0
HOW OFTEN DO YOU HAVE SIX OR MORE DRINKS ON ONE OCCASION: NEVER

## 2025-08-11 ASSESSMENT — SOCIAL DETERMINANTS OF HEALTH (SDOH)
WITHIN THE LAST YEAR, HAVE YOU BEEN KICKED, HIT, SLAPPED, OR OTHERWISE PHYSICALLY HURT BY YOUR PARTNER OR EX-PARTNER?: NO
WITHIN THE LAST YEAR, HAVE YOU BEEN AFRAID OF YOUR PARTNER OR EX-PARTNER?: NO
HOW HARD IS IT FOR YOU TO PAY FOR THE VERY BASICS LIKE FOOD, HOUSING, MEDICAL CARE, AND HEATING?: NOT HARD AT ALL
WITHIN THE LAST YEAR, HAVE YOU BEEN HUMILIATED OR EMOTIONALLY ABUSED IN OTHER WAYS BY YOUR PARTNER OR EX-PARTNER?: NO
WITHIN THE LAST YEAR, HAVE TO BEEN RAPED OR FORCED TO HAVE ANY KIND OF SEXUAL ACTIVITY BY YOUR PARTNER OR EX-PARTNER?: NO

## 2025-08-12 ENCOUNTER — APPOINTMENT (OUTPATIENT)
Dept: OPHTHALMOLOGY | Facility: CLINIC | Age: 60
End: 2025-08-12
Payer: COMMERCIAL

## 2025-08-21 ENCOUNTER — APPOINTMENT (OUTPATIENT)
Dept: OPHTHALMOLOGY | Facility: CLINIC | Age: 60
End: 2025-08-21
Payer: COMMERCIAL

## 2025-08-21 DIAGNOSIS — H40.052 OCULAR HYPERTENSION OF LEFT EYE: ICD-10-CM

## 2025-08-21 DIAGNOSIS — H21.02 HYPHEMA, LEFT EYE: Primary | ICD-10-CM

## 2025-08-21 PROCEDURE — 99212 OFFICE O/P EST SF 10 MIN: CPT | Performed by: OPHTHALMOLOGY

## 2025-08-21 ASSESSMENT — TONOMETRY
IOP_METHOD: GOLDMANN APPLANATION
OS_IOP_MMHG: 14
OD_IOP_MMHG: 13

## 2025-08-21 ASSESSMENT — ENCOUNTER SYMPTOMS
MUSCULOSKELETAL NEGATIVE: 0
HEMATOLOGIC/LYMPHATIC NEGATIVE: 0
EYES NEGATIVE: 0
CONSTITUTIONAL NEGATIVE: 0
PSYCHIATRIC NEGATIVE: 0
NEUROLOGICAL NEGATIVE: 0
CARDIOVASCULAR NEGATIVE: 0
ENDOCRINE NEGATIVE: 0
RESPIRATORY NEGATIVE: 0
ALLERGIC/IMMUNOLOGIC NEGATIVE: 0
GASTROINTESTINAL NEGATIVE: 0

## 2025-08-21 ASSESSMENT — PACHYMETRY
OS_CT(UM): 630
OD_CT(UM): 599

## 2025-08-21 ASSESSMENT — EXTERNAL EXAM - RIGHT EYE: OD_EXAM: NORMAL

## 2025-08-21 ASSESSMENT — CUP TO DISC RATIO
OD_RATIO: 0.2
OS_RATIO: 0.2

## 2025-08-21 ASSESSMENT — REFRACTION_WEARINGRX
OS_CYLINDER: SPHERE
OS_SPHERE: -4.75
OD_AXIS: 108
OD_CYLINDER: -0.75
OD_SPHERE: -3.00

## 2025-08-21 ASSESSMENT — SLIT LAMP EXAM - LIDS
COMMENTS: NORMAL
COMMENTS: NORMAL

## 2025-08-21 ASSESSMENT — VISUAL ACUITY
OS_CC: 20/20
OD_CC: 20/20
CORRECTION_TYPE: GLASSES
METHOD: SNELLEN - LINEAR
OS_CC+: -2

## 2025-08-21 ASSESSMENT — EXTERNAL EXAM - LEFT EYE: OS_EXAM: NORMAL

## 2025-08-22 LAB
CYTOLOGY CMNT CVX/VAG CYTO-IMP: NORMAL
HPV HR 12 DNA GENITAL QL NAA+PROBE: NEGATIVE
HPV HR GENOTYPES PNL CVX NAA+PROBE: NEGATIVE
HPV16 DNA SPEC QL NAA+PROBE: NEGATIVE
HPV18 DNA SPEC QL NAA+PROBE: NEGATIVE
LAB AP HPV GENOTYPE QUESTION: YES
LAB AP HPV HR: NORMAL
LABORATORY COMMENT REPORT: NORMAL
PATH REPORT.TOTAL CANCER: NORMAL

## 2025-09-23 ENCOUNTER — APPOINTMENT (OUTPATIENT)
Dept: OPHTHALMOLOGY | Facility: CLINIC | Age: 60
End: 2025-09-23
Payer: COMMERCIAL

## 2026-08-13 ENCOUNTER — APPOINTMENT (OUTPATIENT)
Dept: OBSTETRICS AND GYNECOLOGY | Facility: CLINIC | Age: 61
End: 2026-08-13
Payer: COMMERCIAL